# Patient Record
Sex: FEMALE | Race: BLACK OR AFRICAN AMERICAN | NOT HISPANIC OR LATINO | Employment: STUDENT | URBAN - METROPOLITAN AREA
[De-identification: names, ages, dates, MRNs, and addresses within clinical notes are randomized per-mention and may not be internally consistent; named-entity substitution may affect disease eponyms.]

---
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
## 2023-09-23 ENCOUNTER — HOSPITAL ENCOUNTER (INPATIENT)
Facility: HOSPITAL | Age: 23
LOS: 3 days | Discharge: HOME | DRG: 603 | End: 2023-09-26
Attending: FAMILY MEDICINE | Admitting: INTERNAL MEDICINE
Payer: MEDICAID

## 2023-09-23 DIAGNOSIS — L02.412 ABSCESS OF LEFT AXILLA: Primary | ICD-10-CM

## 2023-09-23 PROBLEM — L73.2 HIDRADENITIS SUPPURATIVA OF LEFT AXILLA: Status: ACTIVE | Noted: 2023-09-23

## 2023-09-23 PROBLEM — R65.10 SIRS (SYSTEMIC INFLAMMATORY RESPONSE SYNDROME): Status: ACTIVE | Noted: 2023-09-23

## 2023-09-23 LAB
ALBUMIN SERPL BCP-MCNC: 3.7 G/DL (ref 3.5–5.2)
ALP SERPL-CCNC: 96 U/L (ref 55–135)
ALT SERPL W/O P-5'-P-CCNC: 28 U/L (ref 10–44)
ANION GAP SERPL CALC-SCNC: 16 MMOL/L (ref 8–16)
AST SERPL-CCNC: 21 U/L (ref 10–40)
BASOPHILS # BLD AUTO: 0.06 K/UL (ref 0–0.2)
BASOPHILS NFR BLD: 0.4 % (ref 0–1.9)
BILIRUB SERPL-MCNC: 0.5 MG/DL (ref 0.1–1)
BUN SERPL-MCNC: 6 MG/DL (ref 6–20)
CALCIUM SERPL-MCNC: 8.8 MG/DL (ref 8.7–10.5)
CHLORIDE SERPL-SCNC: 103 MMOL/L (ref 95–110)
CO2 SERPL-SCNC: 18 MMOL/L (ref 23–29)
CREAT SERPL-MCNC: 0.6 MG/DL (ref 0.5–1.4)
DIFFERENTIAL METHOD: ABNORMAL
EOSINOPHIL # BLD AUTO: 0.1 K/UL (ref 0–0.5)
EOSINOPHIL NFR BLD: 0.8 % (ref 0–8)
ERYTHROCYTE [DISTWIDTH] IN BLOOD BY AUTOMATED COUNT: 11.6 % (ref 11.5–14.5)
EST. GFR  (NO RACE VARIABLE): >60 ML/MIN/1.73 M^2
GLUCOSE SERPL-MCNC: 102 MG/DL (ref 70–110)
HCT VFR BLD AUTO: 41.8 % (ref 37–48.5)
HCV AB SERPL QL IA: NEGATIVE
HEP C VIRUS HOLD SPECIMEN: NORMAL
HGB BLD-MCNC: 14.3 G/DL (ref 12–16)
HIV 1+2 AB+HIV1 P24 AG SERPL QL IA: NEGATIVE
IMM GRANULOCYTES # BLD AUTO: 0.08 K/UL (ref 0–0.04)
IMM GRANULOCYTES NFR BLD AUTO: 0.5 % (ref 0–0.5)
LACTATE SERPL-SCNC: 0.9 MMOL/L (ref 0.5–2.2)
LYMPHOCYTES # BLD AUTO: 3.6 K/UL (ref 1–4.8)
LYMPHOCYTES NFR BLD: 21.1 % (ref 18–48)
MCH RBC QN AUTO: 30.4 PG (ref 27–31)
MCHC RBC AUTO-ENTMCNC: 34.2 G/DL (ref 32–36)
MCV RBC AUTO: 89 FL (ref 82–98)
MONOCYTES # BLD AUTO: 0.8 K/UL (ref 0.3–1)
MONOCYTES NFR BLD: 4.5 % (ref 4–15)
NEUTROPHILS # BLD AUTO: 12.3 K/UL (ref 1.8–7.7)
NEUTROPHILS NFR BLD: 72.7 % (ref 38–73)
NRBC BLD-RTO: 0 /100 WBC
PLATELET # BLD AUTO: 292 K/UL (ref 150–450)
PMV BLD AUTO: 11.2 FL (ref 9.2–12.9)
POTASSIUM SERPL-SCNC: 3.4 MMOL/L (ref 3.5–5.1)
PROCALCITONIN SERPL IA-MCNC: 0.03 NG/ML
PROT SERPL-MCNC: 8 G/DL (ref 6–8.4)
RBC # BLD AUTO: 4.7 M/UL (ref 4–5.4)
SODIUM SERPL-SCNC: 137 MMOL/L (ref 136–145)
WBC # BLD AUTO: 16.88 K/UL (ref 3.9–12.7)

## 2023-09-23 PROCEDURE — 83605 ASSAY OF LACTIC ACID: CPT | Performed by: REGISTERED NURSE

## 2023-09-23 PROCEDURE — 63600175 PHARM REV CODE 636 W HCPCS: Performed by: INTERNAL MEDICINE

## 2023-09-23 PROCEDURE — 25000003 PHARM REV CODE 250: Performed by: INTERNAL MEDICINE

## 2023-09-23 PROCEDURE — 99285 EMERGENCY DEPT VISIT HI MDM: CPT | Mod: 25

## 2023-09-23 PROCEDURE — 87150 DNA/RNA AMPLIFIED PROBE: CPT | Mod: 59 | Performed by: REGISTERED NURSE

## 2023-09-23 PROCEDURE — 84145 PROCALCITONIN (PCT): CPT | Performed by: REGISTERED NURSE

## 2023-09-23 PROCEDURE — 85025 COMPLETE CBC W/AUTO DIFF WBC: CPT | Performed by: REGISTERED NURSE

## 2023-09-23 PROCEDURE — 63600175 PHARM REV CODE 636 W HCPCS: Performed by: FAMILY MEDICINE

## 2023-09-23 PROCEDURE — 25000003 PHARM REV CODE 250: Performed by: REGISTERED NURSE

## 2023-09-23 PROCEDURE — 87389 HIV-1 AG W/HIV-1&-2 AB AG IA: CPT | Performed by: EMERGENCY MEDICINE

## 2023-09-23 PROCEDURE — 25000003 PHARM REV CODE 250: Performed by: FAMILY MEDICINE

## 2023-09-23 PROCEDURE — 96375 TX/PRO/DX INJ NEW DRUG ADDON: CPT

## 2023-09-23 PROCEDURE — 80053 COMPREHEN METABOLIC PANEL: CPT | Performed by: REGISTERED NURSE

## 2023-09-23 PROCEDURE — 96374 THER/PROPH/DIAG INJ IV PUSH: CPT

## 2023-09-23 PROCEDURE — 86803 HEPATITIS C AB TEST: CPT | Performed by: EMERGENCY MEDICINE

## 2023-09-23 PROCEDURE — 11000001 HC ACUTE MED/SURG PRIVATE ROOM

## 2023-09-23 PROCEDURE — 87040 BLOOD CULTURE FOR BACTERIA: CPT | Mod: 59 | Performed by: REGISTERED NURSE

## 2023-09-23 RX ORDER — MAG HYDROX/ALUMINUM HYD/SIMETH 200-200-20
30 SUSPENSION, ORAL (FINAL DOSE FORM) ORAL EVERY 6 HOURS PRN
Status: DISCONTINUED | OUTPATIENT
Start: 2023-09-23 | End: 2023-09-26 | Stop reason: HOSPADM

## 2023-09-23 RX ORDER — KETOROLAC TROMETHAMINE 30 MG/ML
30 INJECTION, SOLUTION INTRAMUSCULAR; INTRAVENOUS
Status: COMPLETED | OUTPATIENT
Start: 2023-09-23 | End: 2023-09-23

## 2023-09-23 RX ORDER — IPRATROPIUM BROMIDE AND ALBUTEROL SULFATE 2.5; .5 MG/3ML; MG/3ML
3 SOLUTION RESPIRATORY (INHALATION) EVERY 4 HOURS PRN
Status: DISCONTINUED | OUTPATIENT
Start: 2023-09-23 | End: 2023-09-26 | Stop reason: HOSPADM

## 2023-09-23 RX ORDER — ONDANSETRON 2 MG/ML
4 INJECTION INTRAMUSCULAR; INTRAVENOUS EVERY 8 HOURS PRN
Status: DISCONTINUED | OUTPATIENT
Start: 2023-09-23 | End: 2023-09-26 | Stop reason: HOSPADM

## 2023-09-23 RX ORDER — GUAIFENESIN 100 MG/5ML
200 SOLUTION ORAL EVERY 4 HOURS PRN
Status: DISCONTINUED | OUTPATIENT
Start: 2023-09-23 | End: 2023-09-26 | Stop reason: HOSPADM

## 2023-09-23 RX ORDER — MORPHINE SULFATE 4 MG/ML
2 INJECTION, SOLUTION INTRAMUSCULAR; INTRAVENOUS EVERY 4 HOURS PRN
Status: DISCONTINUED | OUTPATIENT
Start: 2023-09-23 | End: 2023-09-24

## 2023-09-23 RX ORDER — HYDROCODONE BITARTRATE AND ACETAMINOPHEN 10; 325 MG/1; MG/1
1 TABLET ORAL
Status: COMPLETED | OUTPATIENT
Start: 2023-09-23 | End: 2023-09-23

## 2023-09-23 RX ORDER — HYDRALAZINE HYDROCHLORIDE 20 MG/ML
10 INJECTION INTRAMUSCULAR; INTRAVENOUS EVERY 8 HOURS PRN
Status: DISCONTINUED | OUTPATIENT
Start: 2023-09-23 | End: 2023-09-26 | Stop reason: HOSPADM

## 2023-09-23 RX ORDER — ACETAMINOPHEN 325 MG/1
650 TABLET ORAL EVERY 6 HOURS PRN
Status: DISCONTINUED | OUTPATIENT
Start: 2023-09-23 | End: 2023-09-26 | Stop reason: HOSPADM

## 2023-09-23 RX ORDER — SODIUM CHLORIDE 9 MG/ML
INJECTION, SOLUTION INTRAVENOUS CONTINUOUS
Status: ACTIVE | OUTPATIENT
Start: 2023-09-23 | End: 2023-09-24

## 2023-09-23 RX ORDER — MORPHINE SULFATE 4 MG/ML
4 INJECTION, SOLUTION INTRAMUSCULAR; INTRAVENOUS EVERY 4 HOURS PRN
Status: DISCONTINUED | OUTPATIENT
Start: 2023-09-23 | End: 2023-09-26

## 2023-09-23 RX ADMIN — KETOROLAC TROMETHAMINE 30 MG: 30 INJECTION, SOLUTION INTRAMUSCULAR; INTRAVENOUS at 07:09

## 2023-09-23 RX ADMIN — VANCOMYCIN HYDROCHLORIDE 1000 MG: 1 INJECTION, POWDER, LYOPHILIZED, FOR SOLUTION INTRAVENOUS at 09:09

## 2023-09-23 RX ADMIN — CEFEPIME 1 G: 1 INJECTION, POWDER, FOR SOLUTION INTRAMUSCULAR; INTRAVENOUS at 11:09

## 2023-09-23 RX ADMIN — ACETAMINOPHEN 650 MG: 325 TABLET ORAL at 11:09

## 2023-09-23 RX ADMIN — HYDROCODONE BITARTRATE AND ACETAMINOPHEN 1 TABLET: 10; 325 TABLET ORAL at 05:09

## 2023-09-23 RX ADMIN — SODIUM CHLORIDE: 9 INJECTION, SOLUTION INTRAVENOUS at 11:09

## 2023-09-23 NOTE — FIRST PROVIDER EVALUATION
Medical screening examination initiated.  I have conducted a focused provider triage encounter, findings are as follows:    Brief history of present illness:  Left axillary swelling and redness, history of HS.  Currently taking antibiotics with no relief.    Vitals:    09/23/23 1704   BP: (!) 142/99   BP Location: Right arm   Patient Position: Sitting   Pulse: (!) 119   Resp: 20   Temp: 98 °F (36.7 °C)   TempSrc: Oral   SpO2: 100%   Weight: 79.5 kg (175 lb 4.3 oz)       Pertinent physical exam:  Tachycardic, patient will not let me palpate site    Brief workup plan:  Workup    Preliminary workup initiated; this workup will be continued and followed by the physician or advanced practice provider that is assigned to the patient when roomed.

## 2023-09-23 NOTE — ED NOTES
Bed: [facility] 03  Expected date:   Expected time:   Means of arrival:   Comments:

## 2023-09-24 ENCOUNTER — ANESTHESIA (OUTPATIENT)
Dept: SURGERY | Facility: HOSPITAL | Age: 23
DRG: 603 | End: 2023-09-24
Payer: MEDICAID

## 2023-09-24 ENCOUNTER — ANESTHESIA EVENT (OUTPATIENT)
Dept: SURGERY | Facility: HOSPITAL | Age: 23
DRG: 603 | End: 2023-09-24
Payer: MEDICAID

## 2023-09-24 LAB
ALBUMIN SERPL BCP-MCNC: 3.3 G/DL (ref 3.5–5.2)
ALP SERPL-CCNC: 84 U/L (ref 55–135)
ALT SERPL W/O P-5'-P-CCNC: 23 U/L (ref 10–44)
ANION GAP SERPL CALC-SCNC: 6 MMOL/L (ref 8–16)
AST SERPL-CCNC: 17 U/L (ref 10–40)
BASOPHILS # BLD AUTO: 0.05 K/UL (ref 0–0.2)
BASOPHILS NFR BLD: 0.3 % (ref 0–1.9)
BILIRUB SERPL-MCNC: 0.4 MG/DL (ref 0.1–1)
BUN SERPL-MCNC: 7 MG/DL (ref 6–20)
CALCIUM SERPL-MCNC: 8.6 MG/DL (ref 8.7–10.5)
CHLORIDE SERPL-SCNC: 109 MMOL/L (ref 95–110)
CO2 SERPL-SCNC: 24 MMOL/L (ref 23–29)
CREAT SERPL-MCNC: 0.6 MG/DL (ref 0.5–1.4)
DIFFERENTIAL METHOD: ABNORMAL
EOSINOPHIL # BLD AUTO: 0.3 K/UL (ref 0–0.5)
EOSINOPHIL NFR BLD: 1.5 % (ref 0–8)
ERYTHROCYTE [DISTWIDTH] IN BLOOD BY AUTOMATED COUNT: 11.7 % (ref 11.5–14.5)
EST. GFR  (NO RACE VARIABLE): >60 ML/MIN/1.73 M^2
GLUCOSE SERPL-MCNC: 102 MG/DL (ref 70–110)
HCT VFR BLD AUTO: 39.4 % (ref 37–48.5)
HGB BLD-MCNC: 13.2 G/DL (ref 12–16)
IMM GRANULOCYTES # BLD AUTO: 0.09 K/UL (ref 0–0.04)
IMM GRANULOCYTES NFR BLD AUTO: 0.5 % (ref 0–0.5)
LYMPHOCYTES # BLD AUTO: 4.3 K/UL (ref 1–4.8)
LYMPHOCYTES NFR BLD: 25 % (ref 18–48)
MAGNESIUM SERPL-MCNC: 1.9 MG/DL (ref 1.6–2.6)
MCH RBC QN AUTO: 30.1 PG (ref 27–31)
MCHC RBC AUTO-ENTMCNC: 33.5 G/DL (ref 32–36)
MCV RBC AUTO: 90 FL (ref 82–98)
MONOCYTES # BLD AUTO: 0.9 K/UL (ref 0.3–1)
MONOCYTES NFR BLD: 5.1 % (ref 4–15)
NEUTROPHILS # BLD AUTO: 11.6 K/UL (ref 1.8–7.7)
NEUTROPHILS NFR BLD: 67.6 % (ref 38–73)
NRBC BLD-RTO: 0 /100 WBC
PLATELET # BLD AUTO: 283 K/UL (ref 150–450)
PMV BLD AUTO: 11.1 FL (ref 9.2–12.9)
POTASSIUM SERPL-SCNC: 3.2 MMOL/L (ref 3.5–5.1)
PROT SERPL-MCNC: 7.1 G/DL (ref 6–8.4)
RBC # BLD AUTO: 4.38 M/UL (ref 4–5.4)
SODIUM SERPL-SCNC: 139 MMOL/L (ref 136–145)
WBC # BLD AUTO: 17.16 K/UL (ref 3.9–12.7)

## 2023-09-24 PROCEDURE — 36415 COLL VENOUS BLD VENIPUNCTURE: CPT | Performed by: INTERNAL MEDICINE

## 2023-09-24 PROCEDURE — 99222 1ST HOSP IP/OBS MODERATE 55: CPT | Mod: 25,,, | Performed by: SURGERY

## 2023-09-24 PROCEDURE — 63600175 PHARM REV CODE 636 W HCPCS: Performed by: SURGERY

## 2023-09-24 PROCEDURE — 85025 COMPLETE CBC W/AUTO DIFF WBC: CPT | Performed by: INTERNAL MEDICINE

## 2023-09-24 PROCEDURE — 25000003 PHARM REV CODE 250: Performed by: NURSE ANESTHETIST, CERTIFIED REGISTERED

## 2023-09-24 PROCEDURE — 36000704 HC OR TIME LEV I 1ST 15 MIN: Performed by: SURGERY

## 2023-09-24 PROCEDURE — 37000009 HC ANESTHESIA EA ADD 15 MINS: Performed by: SURGERY

## 2023-09-24 PROCEDURE — 11000001 HC ACUTE MED/SURG PRIVATE ROOM

## 2023-09-24 PROCEDURE — 25000003 PHARM REV CODE 250: Performed by: SURGERY

## 2023-09-24 PROCEDURE — 63600175 PHARM REV CODE 636 W HCPCS: Performed by: INTERNAL MEDICINE

## 2023-09-24 PROCEDURE — 36000705 HC OR TIME LEV I EA ADD 15 MIN: Performed by: SURGERY

## 2023-09-24 PROCEDURE — C1729 CATH, DRAINAGE: HCPCS | Performed by: SURGERY

## 2023-09-24 PROCEDURE — 80053 COMPREHEN METABOLIC PANEL: CPT | Performed by: INTERNAL MEDICINE

## 2023-09-24 PROCEDURE — 83735 ASSAY OF MAGNESIUM: CPT | Performed by: INTERNAL MEDICINE

## 2023-09-24 PROCEDURE — 63600175 PHARM REV CODE 636 W HCPCS: Performed by: NURSE ANESTHETIST, CERTIFIED REGISTERED

## 2023-09-24 PROCEDURE — 25000003 PHARM REV CODE 250: Performed by: INTERNAL MEDICINE

## 2023-09-24 PROCEDURE — 10061 PR DRAIN SKIN ABSCESS COMPLIC: ICD-10-PCS | Mod: ,,, | Performed by: SURGERY

## 2023-09-24 PROCEDURE — 99222 PR INITIAL HOSPITAL CARE,LEVL II: ICD-10-PCS | Mod: 25,,, | Performed by: SURGERY

## 2023-09-24 PROCEDURE — 10061 I&D ABSCESS COMP/MULTIPLE: CPT | Mod: ,,, | Performed by: SURGERY

## 2023-09-24 PROCEDURE — 37000008 HC ANESTHESIA 1ST 15 MINUTES: Performed by: SURGERY

## 2023-09-24 PROCEDURE — 71000033 HC RECOVERY, INTIAL HOUR: Performed by: SURGERY

## 2023-09-24 RX ORDER — DEXMEDETOMIDINE HYDROCHLORIDE 100 UG/ML
INJECTION, SOLUTION INTRAVENOUS
Status: DISCONTINUED | OUTPATIENT
Start: 2023-09-24 | End: 2023-09-24

## 2023-09-24 RX ORDER — FENTANYL CITRATE 50 UG/ML
INJECTION, SOLUTION INTRAMUSCULAR; INTRAVENOUS
Status: DISCONTINUED | OUTPATIENT
Start: 2023-09-24 | End: 2023-09-24

## 2023-09-24 RX ORDER — ONDANSETRON 2 MG/ML
4 INJECTION INTRAMUSCULAR; INTRAVENOUS ONCE AS NEEDED
Status: CANCELLED | OUTPATIENT
Start: 2023-09-24 | End: 2035-02-20

## 2023-09-24 RX ORDER — ONDANSETRON 2 MG/ML
INJECTION INTRAMUSCULAR; INTRAVENOUS
Status: DISCONTINUED | OUTPATIENT
Start: 2023-09-24 | End: 2023-09-24

## 2023-09-24 RX ORDER — LIDOCAINE HYDROCHLORIDE 10 MG/ML
INJECTION, SOLUTION EPIDURAL; INFILTRATION; INTRACAUDAL; PERINEURAL
Status: DISCONTINUED | OUTPATIENT
Start: 2023-09-24 | End: 2023-09-24 | Stop reason: HOSPADM

## 2023-09-24 RX ORDER — FENTANYL CITRATE 50 UG/ML
25 INJECTION, SOLUTION INTRAMUSCULAR; INTRAVENOUS EVERY 5 MIN PRN
Status: CANCELLED | OUTPATIENT
Start: 2023-09-24

## 2023-09-24 RX ORDER — POTASSIUM CHLORIDE 20 MEQ/1
40 TABLET, EXTENDED RELEASE ORAL ONCE
Status: COMPLETED | OUTPATIENT
Start: 2023-09-24 | End: 2023-09-24

## 2023-09-24 RX ORDER — DIPHENHYDRAMINE HYDROCHLORIDE 50 MG/ML
25 INJECTION INTRAMUSCULAR; INTRAVENOUS EVERY 6 HOURS PRN
Status: CANCELLED | OUTPATIENT
Start: 2023-09-24

## 2023-09-24 RX ORDER — LIDOCAINE HYDROCHLORIDE 20 MG/ML
INJECTION INTRAVENOUS
Status: DISCONTINUED | OUTPATIENT
Start: 2023-09-24 | End: 2023-09-24

## 2023-09-24 RX ORDER — DEXAMETHASONE SODIUM PHOSPHATE 4 MG/ML
INJECTION, SOLUTION INTRA-ARTICULAR; INTRALESIONAL; INTRAMUSCULAR; INTRAVENOUS; SOFT TISSUE
Status: DISCONTINUED | OUTPATIENT
Start: 2023-09-24 | End: 2023-09-24

## 2023-09-24 RX ORDER — HYDROCODONE BITARTRATE AND ACETAMINOPHEN 5; 325 MG/1; MG/1
1 TABLET ORAL
Status: CANCELLED | OUTPATIENT
Start: 2023-09-24

## 2023-09-24 RX ORDER — BUPIVACAINE HYDROCHLORIDE 2.5 MG/ML
INJECTION, SOLUTION EPIDURAL; INFILTRATION; INTRACAUDAL
Status: DISCONTINUED | OUTPATIENT
Start: 2023-09-24 | End: 2023-09-24 | Stop reason: HOSPADM

## 2023-09-24 RX ORDER — HYDROCODONE BITARTRATE AND ACETAMINOPHEN 10; 325 MG/1; MG/1
1 TABLET ORAL EVERY 6 HOURS PRN
Status: DISCONTINUED | OUTPATIENT
Start: 2023-09-24 | End: 2023-09-26 | Stop reason: HOSPADM

## 2023-09-24 RX ORDER — PROPOFOL 10 MG/ML
VIAL (ML) INTRAVENOUS
Status: DISCONTINUED | OUTPATIENT
Start: 2023-09-24 | End: 2023-09-24

## 2023-09-24 RX ADMIN — PROPOFOL 200 MG: 10 INJECTION, EMULSION INTRAVENOUS at 10:09

## 2023-09-24 RX ADMIN — SODIUM CHLORIDE, POTASSIUM CHLORIDE, SODIUM LACTATE AND CALCIUM CHLORIDE: 600; 310; 30; 20 INJECTION, SOLUTION INTRAVENOUS at 10:09

## 2023-09-24 RX ADMIN — HYDROCODONE BITARTRATE AND ACETAMINOPHEN 1 TABLET: 10; 325 TABLET ORAL at 09:09

## 2023-09-24 RX ADMIN — SODIUM CHLORIDE: 9 INJECTION, SOLUTION INTRAVENOUS at 07:09

## 2023-09-24 RX ADMIN — SODIUM CHLORIDE: 9 INJECTION, SOLUTION INTRAVENOUS at 06:09

## 2023-09-24 RX ADMIN — POTASSIUM CHLORIDE 40 MEQ: 1500 TABLET, EXTENDED RELEASE ORAL at 08:09

## 2023-09-24 RX ADMIN — CEFEPIME 1 G: 1 INJECTION, POWDER, FOR SOLUTION INTRAMUSCULAR; INTRAVENOUS at 03:09

## 2023-09-24 RX ADMIN — MORPHINE SULFATE 4 MG: 4 INJECTION INTRAVENOUS at 08:09

## 2023-09-24 RX ADMIN — FENTANYL CITRATE 50 MCG: 50 INJECTION, SOLUTION INTRAMUSCULAR; INTRAVENOUS at 10:09

## 2023-09-24 RX ADMIN — DEXAMETHASONE SODIUM PHOSPHATE 4 MG: 4 INJECTION, SOLUTION INTRA-ARTICULAR; INTRALESIONAL; INTRAMUSCULAR; INTRAVENOUS; SOFT TISSUE at 11:09

## 2023-09-24 RX ADMIN — HYDROCODONE BITARTRATE AND ACETAMINOPHEN 1 TABLET: 10; 325 TABLET ORAL at 03:09

## 2023-09-24 RX ADMIN — DEXMEDETOMIDINE 20 MCG: 200 INJECTION, SOLUTION INTRAVENOUS at 10:09

## 2023-09-24 RX ADMIN — MORPHINE SULFATE 4 MG: 4 INJECTION INTRAVENOUS at 04:09

## 2023-09-24 RX ADMIN — CEFEPIME 1 G: 1 INJECTION, POWDER, FOR SOLUTION INTRAMUSCULAR; INTRAVENOUS at 08:09

## 2023-09-24 RX ADMIN — ONDANSETRON 4 MG: 2 INJECTION INTRAMUSCULAR; INTRAVENOUS at 11:09

## 2023-09-24 RX ADMIN — LIDOCAINE HYDROCHLORIDE 100 MG: 20 INJECTION INTRAVENOUS at 10:09

## 2023-09-24 RX ADMIN — MORPHINE SULFATE 2 MG: 4 INJECTION INTRAVENOUS at 12:09

## 2023-09-24 NOTE — ED PROVIDER NOTES
SCRIBE #1 NOTE: I, Me-Gail Hankins, am scribing for, and in the presence of, Jennifer Luna MD. I have scribed the entire note.       History     Chief Complaint   Patient presents with    Abscess     Abscess to left axilla x 3 days, taken antibiotics x a week, doesn't know name of pill. Hx of hidradenitis.     Review of patient's allergies indicates:  No Known Allergies      History of Present Illness     HPI    9/23/2023, 7:49 PM  History obtained from the patient      History of Present Illness: Barby Farr is a 23 y.o. female patient with a PMHx of asthma and hidradenitis who presents to the Emergency Department for evaluation of abscess to the L axilla which onset 3 days ago. Pt states that she often gets these but states that this is the first time it has gotten to severe. Pt has been on antibiotics prescribed by dermatologist -- doxycycline, taken once AM and once PM -- since July 2023 and states that it is not relieving the pain. Symptoms are constant and worsening in severity. Pt is raising her L arm to mitigate the pain. No associated sxs reported. Patient denies any drainage, and all other sxs at this time. No further complaints or concerns at this time.       Arrival mode: Personal vehicle      PCP: Antonette Leahy MD        Past Medical History:  Past Medical History:   Diagnosis Date    Asthma        Past Surgical History:  Past Surgical History:   Procedure Laterality Date    INCISION AND DRAINAGE OF ABSCESS Left 9/24/2023    Procedure: INCISION AND DRAINAGE, ABSCESS;  Surgeon: Antonio Moseley MD;  Location: [facility];  Service: General;  Laterality: Left;  left axilla         Family History:  History reviewed. No pertinent family history.    Social History:  Social History     Tobacco Use    Smoking status: Never    Smokeless tobacco: Not on file   Substance and Sexual Activity    Alcohol use: No    Drug use: No    Sexual activity: Not on file        Review of Systems     Review of Systems    Skin:         (+) abscess to L axillary, (-) drainage in abscess   All other systems reviewed and are negative.       Physical Exam     Initial Vitals [09/23/23 1704]   BP Pulse Resp Temp SpO2   (!) 142/99 (!) 119 20 98 °F (36.7 °C) 100 %      MAP       --          Physical Exam  Nursing Notes and Vital Signs Reviewed.  Constitutional: Patient is in no acute distress. Well-developed and well-nourished.  Head: Atraumatic. Normocephalic.  Eyes: PERRL. EOM intact. Conjunctivae are not pale. No scleral icterus.  ENT: Mucous membranes are moist. Oropharynx is clear and symmetric.    Neck: Supple. Full ROM. No lymphadenopathy.  Cardiovascular: Regular rate. Regular rhythm. No murmurs, rubs, or gallops. Distal pulses are 2+ and symmetric.  Pulmonary/Chest: No respiratory distress. Clear to auscultation bilaterally. No wheezing or rales.  Abdominal: Soft and non-distended.  There is no tenderness.  No rebound, guarding, or rigidity. Good bowel sounds.  Genitourinary: No CVA tenderness  Musculoskeletal: Moves all extremities. No obvious deformities. No edema. No calf tenderness.  Skin: Warm and dry.  L axilla: Swollen, tender, and warmth extending across anterior chest wall and shoulder.    Neurological:  Alert, awake, and appropriate.  Normal speech.  No acute focal neurological deficits are appreciated.  Psychiatric: Normal affect. Good eye contact. Appropriate in content.     ED Course   Procedures  ED Vital Signs:  Vitals:    09/25/23 0432 09/25/23 0439 09/25/23 0730 09/25/23 0937   BP:  135/81 132/75    Pulse:  73 72    Resp: 18 19 18 15   Temp:   98.2 °F (36.8 °C)    TempSrc:   Oral    SpO2:  100% 100%    Weight:       Height:        09/25/23 1209 09/25/23 1224 09/25/23 1544 09/25/23 1829   BP: (!) 128/90  119/86    Pulse: 74  74    Resp: 18 16 18 15   Temp: 98.9 °F (37.2 °C)  97.4 °F (36.3 °C)    TempSrc: Oral  Oral    SpO2: 98%  98%    Weight:       Height:        09/25/23 1902 09/25/23 2358 09/26/23 0043 09/26/23  0530   BP: 130/81 (!) 108/56  115/74   Pulse: 77 74  68   Resp: 18 18 18 18   Temp: 98.2 °F (36.8 °C) 98.1 °F (36.7 °C)  98.2 °F (36.8 °C)   TempSrc: Oral Oral  Oral   SpO2: 99% 100%  98%   Weight:       Height:        09/26/23 0749 09/26/23 0905 09/26/23 1533   BP: (!) 150/79  133/69   Pulse: 88  67   Resp: 14 16 18   Temp: 98.3 °F (36.8 °C)  98.1 °F (36.7 °C)   TempSrc: Oral     SpO2: 100%     Weight:      Height:          Abnormal Lab Results:  Labs Reviewed   CBC W/ AUTO DIFFERENTIAL - Abnormal; Notable for the following components:       Result Value    WBC 16.88 (*)     Gran # (ANC) 12.3 (*)     Immature Grans (Abs) 0.08 (*)     All other components within normal limits    Narrative:     Release to patient->Immediate   COMPREHENSIVE METABOLIC PANEL - Abnormal; Notable for the following components:    Potassium 3.4 (*)     CO2 18 (*)     All other components within normal limits    Narrative:     Release to patient->Immediate   HIV 1 / 2 ANTIBODY    Narrative:     Release to patient->Immediate   HEPATITIS C ANTIBODY    Narrative:     Release to patient->Immediate   HEP C VIRUS HOLD SPECIMEN    Narrative:     Release to patient->Immediate   LACTIC ACID, PLASMA    Narrative:     Release to patient->Immediate   PROCALCITONIN    Narrative:     Release to patient->Immediate        All Lab Results:  Results for orders placed or performed during the hospital encounter of 09/23/23   Blood Culture #2 **CANNOT BE ORDERED STAT**    Specimen: Peripheral, Forearm, Right; Blood   Result Value Ref Range    Blood Culture, Routine No Growth to date     Blood Culture, Routine No Growth to date     Blood Culture, Routine No Growth to date    Blood Culture #1 **CANNOT BE ORDERED STAT**    Specimen: Peripheral, Antecubital, Right; Blood   Result Value Ref Range    Blood Culture, Routine       Gram stain aer bottle: Gram positive cocci in clusters resembling Staph    Blood Culture, Routine       Results called to and read back by:  Gely Law RN 09/25/2023  00:55    Blood Culture, Routine (A)      COAGULASE-NEGATIVE STAPHYLOCOCCUS SPECIES  Organism is a probable contaminant     MRSA/SA Rapid ID by PCR from Blood culture   Result Value Ref Range    Staph aureus ID by PCR Negative Negative    Methicillin Resistant ID by PCR Negative Negative   HIV 1/2 Ag/Ab (4th Gen)   Result Value Ref Range    HIV 1/2 Ag/Ab Negative Negative   Hepatitis C Antibody   Result Value Ref Range    Hepatitis C Ab Negative Negative   HCV Virus Hold Specimen   Result Value Ref Range    HEP C Virus Hold Specimen Hold for HCV sendout    CBC auto differential   Result Value Ref Range    WBC 16.88 (H) 3.90 - 12.70 K/uL    RBC 4.70 4.00 - 5.40 M/uL    Hemoglobin 14.3 12.0 - 16.0 g/dL    Hematocrit 41.8 37.0 - 48.5 %    MCV 89 82 - 98 fL    MCH 30.4 27.0 - 31.0 pg    MCHC 34.2 32.0 - 36.0 g/dL    RDW 11.6 11.5 - 14.5 %    Platelets 292 150 - 450 K/uL    MPV 11.2 9.2 - 12.9 fL    Immature Granulocytes 0.5 0.0 - 0.5 %    Gran # (ANC) 12.3 (H) 1.8 - 7.7 K/uL    Immature Grans (Abs) 0.08 (H) 0.00 - 0.04 K/uL    Lymph # 3.6 1.0 - 4.8 K/uL    Mono # 0.8 0.3 - 1.0 K/uL    Eos # 0.1 0.0 - 0.5 K/uL    Baso # 0.06 0.00 - 0.20 K/uL    nRBC 0 0 /100 WBC    Gran % 72.7 38.0 - 73.0 %    Lymph % 21.1 18.0 - 48.0 %    Mono % 4.5 4.0 - 15.0 %    Eosinophil % 0.8 0.0 - 8.0 %    Basophil % 0.4 0.0 - 1.9 %    Differential Method Automated    Comprehensive metabolic panel   Result Value Ref Range    Sodium 137 136 - 145 mmol/L    Potassium 3.4 (L) 3.5 - 5.1 mmol/L    Chloride 103 95 - 110 mmol/L    CO2 18 (L) 23 - 29 mmol/L    Glucose 102 70 - 110 mg/dL    BUN 6 6 - 20 mg/dL    Creatinine 0.6 0.5 - 1.4 mg/dL    Calcium 8.8 8.7 - 10.5 mg/dL    Total Protein 8.0 6.0 - 8.4 g/dL    Albumin 3.7 3.5 - 5.2 g/dL    Total Bilirubin 0.5 0.1 - 1.0 mg/dL    Alkaline Phosphatase 96 55 - 135 U/L    AST 21 10 - 40 U/L    ALT 28 10 - 44 U/L    eGFR >60 >60 mL/min/1.73 m^2    Anion Gap 16 8 - 16 mmol/L   Lactic  acid, plasma   Result Value Ref Range    Lactate (Lactic Acid) 0.9 0.5 - 2.2 mmol/L   Procalcitonin   Result Value Ref Range    Procalcitonin 0.03 <0.25 ng/mL   CBC Auto Differential   Result Value Ref Range    WBC 17.16 (H) 3.90 - 12.70 K/uL    RBC 4.38 4.00 - 5.40 M/uL    Hemoglobin 13.2 12.0 - 16.0 g/dL    Hematocrit 39.4 37.0 - 48.5 %    MCV 90 82 - 98 fL    MCH 30.1 27.0 - 31.0 pg    MCHC 33.5 32.0 - 36.0 g/dL    RDW 11.7 11.5 - 14.5 %    Platelets 283 150 - 450 K/uL    MPV 11.1 9.2 - 12.9 fL    Immature Granulocytes 0.5 0.0 - 0.5 %    Gran # (ANC) 11.6 (H) 1.8 - 7.7 K/uL    Immature Grans (Abs) 0.09 (H) 0.00 - 0.04 K/uL    Lymph # 4.3 1.0 - 4.8 K/uL    Mono # 0.9 0.3 - 1.0 K/uL    Eos # 0.3 0.0 - 0.5 K/uL    Baso # 0.05 0.00 - 0.20 K/uL    nRBC 0 0 /100 WBC    Gran % 67.6 38.0 - 73.0 %    Lymph % 25.0 18.0 - 48.0 %    Mono % 5.1 4.0 - 15.0 %    Eosinophil % 1.5 0.0 - 8.0 %    Basophil % 0.3 0.0 - 1.9 %    Differential Method Automated    Magnesium   Result Value Ref Range    Magnesium 1.9 1.6 - 2.6 mg/dL   Comprehensive Metabolic Panel   Result Value Ref Range    Sodium 139 136 - 145 mmol/L    Potassium 3.2 (L) 3.5 - 5.1 mmol/L    Chloride 109 95 - 110 mmol/L    CO2 24 23 - 29 mmol/L    Glucose 102 70 - 110 mg/dL    BUN 7 6 - 20 mg/dL    Creatinine 0.6 0.5 - 1.4 mg/dL    Calcium 8.6 (L) 8.7 - 10.5 mg/dL    Total Protein 7.1 6.0 - 8.4 g/dL    Albumin 3.3 (L) 3.5 - 5.2 g/dL    Total Bilirubin 0.4 0.1 - 1.0 mg/dL    Alkaline Phosphatase 84 55 - 135 U/L    AST 17 10 - 40 U/L    ALT 23 10 - 44 U/L    eGFR >60 >60 mL/min/1.73 m^2    Anion Gap 6 (L) 8 - 16 mmol/L   CBC Auto Differential   Result Value Ref Range    WBC 17.78 (H) 3.90 - 12.70 K/uL    RBC 4.09 4.00 - 5.40 M/uL    Hemoglobin 12.7 12.0 - 16.0 g/dL    Hematocrit 37.6 37.0 - 48.5 %    MCV 92 82 - 98 fL    MCH 31.1 (H) 27.0 - 31.0 pg    MCHC 33.8 32.0 - 36.0 g/dL    RDW 11.6 11.5 - 14.5 %    Platelets 248 150 - 450 K/uL    MPV 10.7 9.2 - 12.9 fL    Immature  Granulocytes 0.4 0.0 - 0.5 %    Gran # (ANC) 13.2 (H) 1.8 - 7.7 K/uL    Immature Grans (Abs) 0.07 (H) 0.00 - 0.04 K/uL    Lymph # 3.5 1.0 - 4.8 K/uL    Mono # 0.7 0.3 - 1.0 K/uL    Eos # 0.2 0.0 - 0.5 K/uL    Baso # 0.05 0.00 - 0.20 K/uL    nRBC 0 0 /100 WBC    Gran % 74.3 (H) 38.0 - 73.0 %    Lymph % 19.9 18.0 - 48.0 %    Mono % 4.0 4.0 - 15.0 %    Eosinophil % 1.1 0.0 - 8.0 %    Basophil % 0.3 0.0 - 1.9 %    Differential Method Automated    Basic metabolic panel   Result Value Ref Range    Sodium 140 136 - 145 mmol/L    Potassium 3.4 (L) 3.5 - 5.1 mmol/L    Chloride 106 95 - 110 mmol/L    CO2 22 (L) 23 - 29 mmol/L    Glucose 123 (H) 70 - 110 mg/dL    BUN 4 (L) 6 - 20 mg/dL    Creatinine 0.6 0.5 - 1.4 mg/dL    Calcium 8.5 (L) 8.7 - 10.5 mg/dL    Anion Gap 12 8 - 16 mmol/L    eGFR >60 >60 mL/min/1.73 m^2   CBC Auto Differential   Result Value Ref Range    WBC 12.15 3.90 - 12.70 K/uL    RBC 3.92 (L) 4.00 - 5.40 M/uL    Hemoglobin 12.0 12.0 - 16.0 g/dL    Hematocrit 37.2 37.0 - 48.5 %    MCV 95 82 - 98 fL    MCH 30.6 27.0 - 31.0 pg    MCHC 32.3 32.0 - 36.0 g/dL    RDW 11.8 11.5 - 14.5 %    Platelets 265 150 - 450 K/uL    MPV 11.1 9.2 - 12.9 fL    Immature Granulocytes 0.2 0.0 - 0.5 %    Gran # (ANC) 7.5 1.8 - 7.7 K/uL    Immature Grans (Abs) 0.03 0.00 - 0.04 K/uL    Lymph # 3.7 1.0 - 4.8 K/uL    Mono # 0.5 0.3 - 1.0 K/uL    Eos # 0.4 0.0 - 0.5 K/uL    Baso # 0.05 0.00 - 0.20 K/uL    nRBC 0 0 /100 WBC    Gran % 61.9 38.0 - 73.0 %    Lymph % 30.5 18.0 - 48.0 %    Mono % 4.0 4.0 - 15.0 %    Eosinophil % 3.0 0.0 - 8.0 %    Basophil % 0.4 0.0 - 1.9 %    Differential Method Automated    Basic metabolic panel   Result Value Ref Range    Sodium 139 136 - 145 mmol/L    Potassium 3.1 (L) 3.5 - 5.1 mmol/L    Chloride 108 95 - 110 mmol/L    CO2 23 23 - 29 mmol/L    Glucose 119 (H) 70 - 110 mg/dL    BUN 9 6 - 20 mg/dL    Creatinine 0.6 0.5 - 1.4 mg/dL    Calcium 8.3 (L) 8.7 - 10.5 mg/dL    Anion Gap 8 8 - 16 mmol/L    eGFR >60 >60  mL/min/1.73 m^2         Imaging Results:  Imaging Results               CT Shoulder Without Contrast Left (Final result)  Result time 09/23/23 20:16:37      Final result by Freddie Boudreaux MD (09/23/23 20:16:37)                   Impression:      As above.    This report was flagged in Epic as abnormal.    All CT scans at this facility are performed  using dose modulation techniques as appropriate to performed exam including the following:  automated exposure control; adjustment of mA and/or kV according to the patients size (this includes techniques or standardized protocols for targeted exams where dose is matched to indication/reason for exam: i.e. extremities or head);  iterative reconstruction technique.      Electronically signed by: Freddie Boudreaux  Date:    09/23/2023  Time:    20:16               Narrative:    EXAMINATION:  CT SHOULDER WITHOUT CONTRAST LEFT    CLINICAL HISTORY:  Soft tissue mass, shoulder;    TECHNIQUE:  Low-dose axial noncontrast CT images of the shoulder were obtained.    COMPARISON:  None    FINDINGS:  Soft tissue mass along the axillary region.  Absence of IV contrast significantly degrades the exam.  It is unclear if this represents neoplasm or abscess.  Correlation with symptoms.    Local inflammatory stranding favors infection.  Skin thickening and suspected reactive lymphadenopathy.    Osseous structures grossly intact.  No fracture or traumatic malalignment.  Bipartite acromion.                                              The Emergency Provider reviewed the vital signs and test results, which are outlined above.     ED Discussion     9:29 PM: Discussed pt's case with Dr. Antonio Moseley MD (General Surgery) who recommends admission for IV antibiotics. Dr. Moseley also recommends NPO after midnight and consult tomorrow to see if there is anything that needs to be drained.    9:32 PM: Discussed case with Laura Johns NP (Hospital Medicine). Dr. Marshall agrees with current care and  management of pt and accepts admission.   Admitting Service: Hospital Medicine  Admitting Physician: Dr. Marshall  Admit to: Inpatient Med/Surg    9:33 PM: Re-evaluated pt. I have discussed test results, shared treatment plan, and the need for admission with patient and family at bedside. Pt and family express understanding at this time and agree with all information. All questions answered. Pt and family have no further questions or concerns at this time. Pt is ready for admit.       Medical Decision Making  Amount and/or Complexity of Data Reviewed  Radiology: ordered.    Risk  Prescription drug management.  Decision regarding hospitalization.                ED Medication(s):  Medications   acetaminophen tablet 650 mg (650 mg Oral Given 9/23/23 2302)   ondansetron injection 4 mg (has no administration in time range)   albuterol-ipratropium 2.5 mg-0.5 mg/3 mL nebulizer solution 3 mL (has no administration in time range)   aluminum-magnesium hydroxide-simethicone 200-200-20 mg/5 mL suspension 30 mL (has no administration in time range)   guaiFENesin 100 mg/5 ml syrup 200 mg (has no administration in time range)   0.9%  NaCl infusion ( Intravenous New Bag 9/24/23 1826)   hydrALAZINE injection 10 mg (has no administration in time range)   HYDROcodone-acetaminophen  mg per tablet 1 tablet (1 tablet Oral Given 9/26/23 0905)   0.9%  NaCl infusion (has no administration in time range)   potassium chloride SA CR tablet 40 mEq (40 mEq Oral Given 9/26/23 0905)   cefazolin (ANCEF) 2 gram in dextrose 5% 50 mL IVPB (premix) (has no administration in time range)   morphine injection 2 mg (has no administration in time range)   HYDROcodone-acetaminophen  mg per tablet 1 tablet (1 tablet Oral Given 9/23/23 1727)   ketorolac injection 30 mg (30 mg Intravenous Given 9/23/23 1953)   vancomycin (VANCOCIN) 1,000 mg in dextrose 5 % (D5W) 250 mL IVPB (Vial-Mate) (0 mg Intravenous Stopped 9/23/23 2342)   potassium chloride SA CR  tablet 40 mEq (40 mEq Oral Given 9/24/23 0848)   vancomycin 1.75 g in 5 % dextrose 500 mL IVPB (0 mg Intravenous Stopped 9/25/23 0417)   potassium chloride SA CR tablet 40 mEq (40 mEq Oral Given 9/25/23 1044)   ketorolac injection 15 mg (15 mg Intravenous Given 9/25/23 1046)   morphine injection 2 mg (2 mg Intravenous Given 9/25/23 1224)       There are no discharge medications for this patient.              Scribe Attestation:   Scribe #1: I performed the above scribed service and the documentation accurately describes the services I performed. I attest to the accuracy of the note.     Attending:   Physician Attestation Statement for Scribe #1: I, Jennifer Luna MD, personally performed the services described in this documentation, as scribed by Son Hankins, in my presence, and it is both accurate and complete.           Clinical Impression       ICD-10-CM ICD-9-CM   1. Abscess of left axilla  L02.412 682.3       Disposition:   Disposition: Admitted  Condition: Serious         Jennifer Luna MD  09/26/23 1619

## 2023-09-24 NOTE — OP NOTE
[facility] - Med Surg 3  Surgery Department  Operative Note    SUMMARY     Date of Procedure: 9/24/2023     Procedure:   Incision and drainage left axillary abscess    Surgeon(s) and Role:     * Antonio Moseley MD - Primary    Assisting Surgeon: None    Pre-Operative Diagnosis: Abscess of left axilla [L02.412]    Post-Operative Diagnosis: Post-Op Diagnosis Codes:     * Abscess of left axilla [L02.412]    Anesthesia: General    Operative Findings (including complications, if any): Incision and drainage left axillary abscess    Description of Technical Procedures:  Left axillary abscess    Estimated Blood Loss (EBL): 10cc           Implants: * No implants in log *    Specimens:   Specimen (24h ago, onward)      None                    Condition: Good    Disposition: PACU - hemodynamically stable.    Procedure in detail:   The patient was brought to the OR and underwent general anesthesia.  Her left arm, axilla, and chest were prepped and draped in usual sterile fashion.  Incision was made over the fluctuant area draining a large amount of purulent fluid.  The wound was explored and tracking to a 2nd area that was also fluctuant.  A counter incision was made and area washed out until clear.  Local anesthetic was injected around the incision site.  A Penrose drain was placed between the 2 incisions.  The wounds were packed with wet-to-dry Kerlix gauze.  Sterile dressings were applied.  The patient was transferred to recovery in a stable and satisfactory condition.

## 2023-09-24 NOTE — SUBJECTIVE & OBJECTIVE
Past Medical History:   Diagnosis Date    Asthma        History reviewed. No pertinent surgical history.    Review of patient's allergies indicates:  No Known Allergies    No current facility-administered medications on file prior to encounter.     Current Outpatient Medications on File Prior to Encounter   Medication Sig    azithromycin (ZITHROMAX Z-LALA) 250 MG tablet Take 2 tablets (500 mg total) by mouth once daily.    naproxen (NAPROSYN) 500 MG tablet Take 1 tablet (500 mg total) by mouth 2 (two) times daily with meals.     Family History    None       Tobacco Use    Smoking status: Never    Smokeless tobacco: Not on file   Substance and Sexual Activity    Alcohol use: No    Drug use: No    Sexual activity: Not on file     Review of Systems   Constitutional: Negative.  Negative for chills.   HENT: Negative.  Negative for congestion, sore throat and trouble swallowing.    Eyes: Negative.    Respiratory: Negative.  Negative for cough, shortness of breath and wheezing.    Cardiovascular: Negative.  Negative for chest pain and palpitations.   Gastrointestinal: Negative.  Negative for abdominal pain, diarrhea, nausea and vomiting.   Endocrine: Negative.    Genitourinary: Negative.  Negative for dysuria and flank pain.   Musculoskeletal: Negative.  Negative for back pain.   Skin:  Positive for wound (left axilla). Negative for rash.   Allergic/Immunologic: Negative.    Neurological: Negative.  Negative for speech difficulty, weakness, numbness and headaches.   Hematological: Negative.    Psychiatric/Behavioral: Negative.  Negative for hallucinations. The patient is not nervous/anxious.    All other systems reviewed and are negative.    Objective:     Vital Signs (Most Recent):  Temp: 98.4 °F (36.9 °C) (09/23/23 2116)  Pulse: 83 (09/23/23 2116)  Resp: 18 (09/23/23 2116)  BP: 136/87 (09/23/23 2116)  SpO2: 100 % (09/23/23 2116) Vital Signs (24h Range):  Temp:  [98 °F (36.7 °C)-98.4 °F (36.9 °C)] 98.4 °F (36.9 °C)  Pulse:   [] 83  Resp:  [18-20] 18  SpO2:  [100 %] 100 %  BP: (136-142)/(87-99) 136/87     Weight: 79.5 kg (175 lb 4.3 oz)  Body mass index is 31.05 kg/m².     Physical Exam  Vitals and nursing note reviewed.   Constitutional:       General: She is awake. She is not in acute distress.     Appearance: She is obese. She is not ill-appearing.   HENT:      Head: Normocephalic and atraumatic.      Mouth/Throat:      Mouth: Mucous membranes are moist.   Eyes:      General: No scleral icterus.     Conjunctiva/sclera: Conjunctivae normal.   Cardiovascular:      Rate and Rhythm: Normal rate and regular rhythm.   Pulmonary:      Effort: Pulmonary effort is normal. No respiratory distress.      Breath sounds: Normal breath sounds. No wheezing.   Abdominal:      Palpations: Abdomen is soft.      Tenderness: There is no abdominal tenderness.   Musculoskeletal:         General: No swelling. Normal range of motion.      Cervical back: Normal range of motion and neck supple.   Skin:     General: Skin is warm.      Coloration: Skin is not jaundiced.      Findings: Erythema (Swollen, tender, left axillary area, erythematous.  See pictures in chart) present.   Neurological:      General: No focal deficit present.      Mental Status: She is alert and oriented to person, place, and time. Mental status is at baseline.   Psychiatric:         Speech: Speech normal.         Behavior: Behavior is cooperative.                Significant Labs: All pertinent labs within the past 24 hours have been reviewed.  BMP:   Recent Labs   Lab 09/23/23 1958         K 3.4*      CO2 18*   BUN 6   CREATININE 0.6   CALCIUM 8.8     CBC:   Recent Labs   Lab 09/23/23 1958   WBC 16.88*   HGB 14.3   HCT 41.8        CMP:   Recent Labs   Lab 09/23/23 1958      K 3.4*      CO2 18*      BUN 6   CREATININE 0.6   CALCIUM 8.8   PROT 8.0   ALBUMIN 3.7   BILITOT 0.5   ALKPHOS 96   AST 21   ALT 28   ANIONGAP 16       Significant  Imaging: I have reviewed all pertinent imaging results/findings within the past 24 hours.  I have reviewed and interpreted all pertinent imaging results/findings within the past 24 hours.    Imaging Results               CT Shoulder Without Contrast Left (Final result)  Result time 09/23/23 20:16:37      Final result by Freddie Boudreaux MD (09/23/23 20:16:37)                   Impression:      As above.    This report was flagged in Epic as abnormal.    All CT scans at this facility are performed  using dose modulation techniques as appropriate to performed exam including the following:  automated exposure control; adjustment of mA and/or kV according to the patients size (this includes techniques or standardized protocols for targeted exams where dose is matched to indication/reason for exam: i.e. extremities or head);  iterative reconstruction technique.      Electronically signed by: Freddie Boudreaux  Date:    09/23/2023  Time:    20:16               Narrative:    EXAMINATION:  CT SHOULDER WITHOUT CONTRAST LEFT    CLINICAL HISTORY:  Soft tissue mass, shoulder;    TECHNIQUE:  Low-dose axial noncontrast CT images of the shoulder were obtained.    COMPARISON:  None    FINDINGS:  Soft tissue mass along the axillary region.  Absence of IV contrast significantly degrades the exam.  It is unclear if this represents neoplasm or abscess.  Correlation with symptoms.    Local inflammatory stranding favors infection.  Skin thickening and suspected reactive lymphadenopathy.    Osseous structures grossly intact.  No fracture or traumatic malalignment.  Bipartite acromion.                                    I have independently reviewed all pertinent labs within the past 24 hours.    I have independently reviewed, visualized and interpreted all pertinent imaging results within the past 24 hours and discussed the findings with the ED physician, Dr. Luna    ,   not sure

## 2023-09-24 NOTE — HPI
23-year-old female referred for left axillary abscess.  She is been undergoing treatment for hidradenitis with recent pain and swelling that has not been improving.  No drainage.  CT scan showing significant inflammation of elective axilla.  She is not been on biologic medications for hidradenitis only antibiotic washes and oral antibiotics.

## 2023-09-24 NOTE — H&P
[facility] - Emergency Dept.  [facility] Medicine  History & Physical    Patient Name: Barby Farr  MRN: [id number]  Patient Class: IP- Inpatient  Admission Date: 9/23/2023  Attending Physician: Kurt Marshall MD   Primary Care Provider: Antonette Leahy MD         Patient information was obtained from patient, past medical records and ER records.     Subjective:     Principal Problem:Abscess of left axilla    Chief Complaint:   Chief Complaint   Patient presents with    Abscess     Abscess to left axilla x 3 days, taken antibiotics x a week, doesn't know name of pill. Hx of hidradenitis.        HPI: Ms. Farr is a 23-year-old  female with PMH significant for hidradenitis suppurativa, has been on doxycycline daily since July 2023 for suppressive treatment, followed by dermatology, presented to the ED complaining of worsening swelling, pain in the left axillary region, extending to the left chest, associated with low-grade fever.  Afebrile in the ED, , WBC 53586, no bandemia lactic acidosis.  CT shoulder without contrast reveals soft tissue mass along the axillary region.  ED physician discussed case with General surgery on-call, recommended placing in observation for possible I&D in a.m..  Patient started on IV vancomycin, cefepime after obtaining cultures.      Placed in observation for left axillary abscess, history of suppurative hidradenitis.      Past Medical History:   Diagnosis Date    Asthma        History reviewed. No pertinent surgical history.    Review of patient's allergies indicates:  No Known Allergies    No current facility-administered medications on file prior to encounter.     Current Outpatient Medications on File Prior to Encounter   Medication Sig    azithromycin (ZITHROMAX Z-LALA) 250 MG tablet Take 2 tablets (500 mg total) by mouth once daily.    naproxen (NAPROSYN) 500 MG tablet Take 1 tablet (500 mg total) by mouth 2 (two) times daily with meals.     Family History     Reviewed and Not Pertinent       Tobacco Use    Smoking status: Never    Smokeless tobacco: Not on file   Substance and Sexual Activity    Alcohol use: No    Drug use: No    Sexual activity: Not on file     Review of Systems   Constitutional: Negative.  Negative for chills.   HENT: Negative.  Negative for congestion, sore throat and trouble swallowing.    Eyes: Negative.    Respiratory: Negative.  Negative for cough, shortness of breath and wheezing.    Cardiovascular: Negative.  Negative for chest pain and palpitations.   Gastrointestinal: Negative.  Negative for abdominal pain, diarrhea, nausea and vomiting.   Endocrine: Negative.    Genitourinary: Negative.  Negative for dysuria and flank pain.   Musculoskeletal: Negative.  Negative for back pain.   Skin:  Positive for wound (left axilla). Negative for rash.   Allergic/Immunologic: Negative.    Neurological: Negative.  Negative for speech difficulty, weakness, numbness and headaches.   Hematological: Negative.    Psychiatric/Behavioral: Negative.  Negative for hallucinations. The patient is not nervous/anxious.    All other systems reviewed and are negative.    Objective:     Vital Signs (Most Recent):  Temp: 98.4 °F (36.9 °C) (09/23/23 2116)  Pulse: 83 (09/23/23 2116)  Resp: 18 (09/23/23 2116)  BP: 136/87 (09/23/23 2116)  SpO2: 100 % (09/23/23 2116) Vital Signs (24h Range):  Temp:  [98 °F (36.7 °C)-98.4 °F (36.9 °C)] 98.4 °F (36.9 °C)  Pulse:  [] 83  Resp:  [18-20] 18  SpO2:  [100 %] 100 %  BP: (136-142)/(87-99) 136/87     Weight: 79.5 kg (175 lb 4.3 oz)  Body mass index is 31.05 kg/m².     Physical Exam  Vitals and nursing note reviewed.   Constitutional:       General: She is awake. She is not in acute distress.     Appearance: She is obese. She is not ill-appearing.   HENT:      Head: Normocephalic and atraumatic.      Mouth/Throat:      Mouth: Mucous membranes are moist.   Eyes:      General: No scleral icterus.     Conjunctiva/sclera:  Conjunctivae normal.   Cardiovascular:      Rate and Rhythm: Normal rate and regular rhythm.   Pulmonary:      Effort: Pulmonary effort is normal. No respiratory distress.      Breath sounds: Normal breath sounds. No wheezing.   Abdominal:      Palpations: Abdomen is soft.      Tenderness: There is no abdominal tenderness.   Musculoskeletal:         General: No swelling. Normal range of motion.      Cervical back: Normal range of motion and neck supple.   Skin:     General: Skin is warm.      Coloration: Skin is not jaundiced.      Findings: Erythema (Swollen, tender, left axillary area, erythematous.  See pictures in chart) present.   Neurological:      General: No focal deficit present.      Mental Status: She is alert and oriented to person, place, and time. Mental status is at baseline.   Psychiatric:         Speech: Speech normal.         Behavior: Behavior is cooperative.                Significant Labs: All pertinent labs within the past 24 hours have been reviewed.  BMP:   Recent Labs   Lab 09/23/23 1958         K 3.4*      CO2 18*   BUN 6   CREATININE 0.6   CALCIUM 8.8     CBC:   Recent Labs   Lab 09/23/23 1958   WBC 16.88*   HGB 14.3   HCT 41.8        CMP:   Recent Labs   Lab 09/23/23 1958      K 3.4*      CO2 18*      BUN 6   CREATININE 0.6   CALCIUM 8.8   PROT 8.0   ALBUMIN 3.7   BILITOT 0.5   ALKPHOS 96   AST 21   ALT 28   ANIONGAP 16       Significant Imaging: I have reviewed all pertinent imaging results/findings within the past 24 hours.  I have reviewed and interpreted all pertinent imaging results/findings within the past 24 hours.    Imaging Results               CT Shoulder Without Contrast Left (Final result)  Result time 09/23/23 20:16:37      Final result by Freddie Boudreaux MD (09/23/23 20:16:37)                   Impression:      As above.    This report was flagged in Epic as abnormal.    All CT scans at this facility are performed  using  dose modulation techniques as appropriate to performed exam including the following:  automated exposure control; adjustment of mA and/or kV according to the patients size (this includes techniques or standardized protocols for targeted exams where dose is matched to indication/reason for exam: i.e. extremities or head);  iterative reconstruction technique.      Electronically signed by: Freddie Janusz  Date:    09/23/2023  Time:    20:16               Narrative:    EXAMINATION:  CT SHOULDER WITHOUT CONTRAST LEFT    CLINICAL HISTORY:  Soft tissue mass, shoulder;    TECHNIQUE:  Low-dose axial noncontrast CT images of the shoulder were obtained.    COMPARISON:  None    FINDINGS:  Soft tissue mass along the axillary region.  Absence of IV contrast significantly degrades the exam.  It is unclear if this represents neoplasm or abscess.  Correlation with symptoms.    Local inflammatory stranding favors infection.  Skin thickening and suspected reactive lymphadenopathy.    Osseous structures grossly intact.  No fracture or traumatic malalignment.  Bipartite acromion.                                    I have independently reviewed all pertinent labs within the past 24 hours.    I have independently reviewed, visualized and interpreted all pertinent imaging results within the past 24 hours and discussed the findings with the ED physician, Dr. Luna    ,    Assessment/Plan:     * Abscess of left axilla  -general surgery consulted.    -keep NPO past midnight.    -continue IV vancomycin, cefepime.      SIRS (systemic inflammatory response syndrome)  -afebrile in the ED, , WBC 02365.    -secondary to left axillary abscess/hidradenitis suppurative.    -continue IV vancomycin, cefepime.    -continue fluids.    -hemodynamically stable.      Hidradenitis suppurativa of left axilla  -per patient, has been on suppressive therapy with doxycycline for the past few months, followed by Dermatology.        VTE Risk Mitigation (From  admission, onward)         Ordered     Place sequential compression device  Until discontinued         09/23/23 7973                   Kurt Marshall MD  Department of Hospital Medicine  [facility] - Emergency Dept.

## 2023-09-24 NOTE — ASSESSMENT & PLAN NOTE
-afebrile in the ED, , WBC 04367.    -secondary to left axillary abscess/hidradenitis suppurative.    -continue IV vancomycin, cefepime.    -continue fluids.    -hemodynamically stable.

## 2023-09-24 NOTE — PLAN OF CARE
Call bell and personal items within reach. VSS. Pain controlled with PRN meds. Pt ambulates independently. IV fluids and antibiotics administered. PT NPO pending general surgery consult. Chart check complete.     Problem: Adult Inpatient Plan of Care  Goal: Plan of Care Review  Outcome: Ongoing, Progressing     Problem: Adult Inpatient Plan of Care  Goal: Absence of Hospital-Acquired Illness or Injury  Outcome: Ongoing, Progressing     Problem: Pain Acute  Goal: Acceptable Pain Control and Functional Ability  Outcome: Ongoing, Progressing  Intervention: Develop Pain Management Plan  Flowsheets (Taken 9/24/2023 0136)  Pain Management Interventions: medication offered     Problem: Infection  Goal: Absence of Infection Signs and Symptoms  Outcome: Ongoing, Progressing  Intervention: Prevent or Manage Infection  Flowsheets (Taken 9/24/2023 0136)  Fever Reduction/Comfort Measures: (IV antibiotcs administered) other (see comments)

## 2023-09-24 NOTE — TRANSFER OF CARE
"Anesthesia Transfer of Care Note    Patient: Barby Farr    Procedure(s) Performed: Procedure(s) (LRB):  INCISION AND DRAINAGE, ABSCESS (Left)    Patient location: PACU    Anesthesia Type: general    Transport from OR: Transported from OR on room air with adequate spontaneous ventilation    Post pain: adequate analgesia    Post assessment: no apparent anesthetic complications and tolerated procedure well    Post vital signs: stable    Level of consciousness: awake, alert and oriented    Nausea/Vomiting: no nausea/vomiting    Complications: none    Transfer of care protocol was followed      Last vitals:   Visit Vitals  BP (!) 132/91 (BP Location: Right arm, Patient Position: Lying)   Pulse 98   Temp 36.8 °C (98.3 °F) (Oral)   Resp 16   Ht 5' 3" (1.6 m)   Wt 78.9 kg (173 lb 15.1 oz)   LMP 08/29/2023 (Exact Date)   SpO2 100%   Breastfeeding No   BMI 30.81 kg/m²     "

## 2023-09-24 NOTE — PROGRESS NOTES
[facility] Surg [address] [place] [address] Medicine  Progress Note    Patient Name: Barby Farr  MRN: [id number]  Patient Class: IP- Inpatient   Admission Date: 9/23/2023  Length of Stay: 1 days  Attending Physician: Ena Kenney MD  Primary Care Provider: Antonette Leahy MD        Subjective:     Principal Problem:Abscess of left axilla        HPI:  Ms. Farr is a 23-year-old  female with PMH significant for hidradenitis suppurativa, has been on doxycycline daily since July 2023 for suppressive treatment, followed by dermatology, presented to the ED complaining of worsening swelling, pain in the left axillary region, extending to the left chest, associated with low-grade fever.  Afebrile in the ED, , WBC 75369, no bandemia lactic acidosis.  CT shoulder without contrast reveals soft tissue mass along the axillary region.  ED physician discussed case with General surgery on-call, recommended placing in observation for possible I&D in a.m..  Patient started on IV vancomycin, cefepime after obtaining cultures.      Placed in observation for left axillary abscess, history of suppurative hidradenitis.      Overview/Hospital Course:  The patient presented with  left axillary abscess. She was placed on IV antibiotics due to significant leukocytosis in presence of oral antibiotics. Surgery was consulted and patient underwent I&D with penrose placement.       Interval History: patient seen this morning before surgery, unable to put her left arm down    Review of Systems  Objective:     Vital Signs (Most Recent):  Temp: 98.4 °F (36.9 °C) (09/24/23 1614)  Pulse: 82 (09/24/23 1614)  Resp: 18 (09/24/23 1614)  BP: 128/74 (09/24/23 1614)  SpO2: 97 % (09/24/23 1614) Vital Signs (24h Range):  Temp:  [97.8 °F (36.6 °C)-99.6 °F (37.6 °C)] 98.4 °F (36.9 °C)  Pulse:  [] 82  Resp:  [16-20] 18  SpO2:  [96 %-100 %] 97 %  BP: (112-152)/(70-99) 128/74     Weight: 78.9 kg (173 lb 15.1 oz)  Body mass index is  30.81 kg/m².    Intake/Output Summary (Last 24 hours) at 9/24/2023 1645  Last data filed at 9/24/2023 1633  Gross per 24 hour   Intake 2623.08 ml   Output --   Net 2623.08 ml         Physical Exam  HENT:      Head: Normocephalic and atraumatic.   Cardiovascular:      Rate and Rhythm: Normal rate and regular rhythm.      Heart sounds: No murmur heard.  Pulmonary:      Effort: Pulmonary effort is normal. No respiratory distress.      Breath sounds: Normal breath sounds. No wheezing.   Abdominal:      General: Bowel sounds are normal. There is no distension.      Palpations: Abdomen is soft.      Tenderness: There is no abdominal tenderness.   Musculoskeletal:         General: No swelling.   Skin:     General: Skin is warm and dry.   Neurological:      Mental Status: She is alert and oriented to person, place, and time. Mental status is at baseline.             Significant Labs: All pertinent labs within the past 24 hours have been reviewed.  CBC:   Recent Labs   Lab 09/23/23 1958 09/24/23  0420   WBC 16.88* 17.16*   HGB 14.3 13.2   HCT 41.8 39.4    283     CMP:   Recent Labs   Lab 09/23/23 1958 09/24/23  0420    139   K 3.4* 3.2*    109   CO2 18* 24    102   BUN 6 7   CREATININE 0.6 0.6   CALCIUM 8.8 8.6*   PROT 8.0 7.1   ALBUMIN 3.7 3.3*   BILITOT 0.5 0.4   ALKPHOS 96 84   AST 21 17   ALT 28 23   ANIONGAP 16 6*       Significant Imaging: I have reviewed all pertinent imaging results/findings within the past 24 hours.      Assessment/Plan:      * Abscess of left axilla  -s/p I&D with Dr. Moseley  -continue IV vancomycin, cefepime.  -repeat CBC in am  -monitor drainage      Hidradenitis suppurativa of left axilla  -per patient, has been on suppressive therapy with doxycycline for the past few months, followed by Dermatology.      SIRS (systemic inflammatory response syndrome)  -secondary to left axillary abscess/hidradenitis suppurative.    -continue IV vancomycin, cefepime.    -continue  fluids.    -hemodynamically stable.        VTE Risk Mitigation (From admission, onward)         Ordered     Place sequential compression device  Until discontinued         09/23/23 2133                Discharge Planning   RONNELL:      Code Status: Full Code   Is the patient medically ready for discharge?:     Reason for patient still in hospital (select all that apply): Patient trending condition, Treatment and Consult recommendations  Discharge Plan A: Home                  Ena Kenney MD  Department of Hospital Medicine   [facility] - Med Surg 3

## 2023-09-24 NOTE — SUBJECTIVE & OBJECTIVE
No current facility-administered medications on file prior to encounter.     No current outpatient medications on file prior to encounter.       Review of patient's allergies indicates:  No Known Allergies    Past Medical History:   Diagnosis Date    Asthma      History reviewed. No pertinent surgical history.  Family History    None       Tobacco Use    Smoking status: Never    Smokeless tobacco: Not on file   Substance and Sexual Activity    Alcohol use: No    Drug use: No    Sexual activity: Not on file     Review of Systems   Constitutional:  Negative for chills, fatigue, fever and unexpected weight change.   Respiratory:  Negative for cough, shortness of breath, wheezing and stridor.    Cardiovascular:  Negative for chest pain, palpitations and leg swelling.   Gastrointestinal:  Negative for abdominal distention, abdominal pain, constipation, diarrhea, nausea and vomiting.   Genitourinary:  Negative for difficulty urinating, dysuria, frequency, hematuria and urgency.   Skin:  Positive for wound. Negative for color change, pallor and rash.   Hematological:  Does not bruise/bleed easily.     Objective:     Vital Signs (Most Recent):  Temp: 98.3 °F (36.8 °C) (09/24/23 0731)  Pulse: 98 (09/24/23 0731)  Resp: 16 (09/24/23 0845)  BP: (!) 132/91 (09/24/23 0731)  SpO2: 100 % (09/24/23 0731) Vital Signs (24h Range):  Temp:  [97.8 °F (36.6 °C)-98.4 °F (36.9 °C)] 98.3 °F (36.8 °C)  Pulse:  [] 98  Resp:  [16-20] 16  SpO2:  [100 %] 100 %  BP: (132-152)/(87-99) 132/91     Weight: 78.9 kg (173 lb 15.1 oz)  Body mass index is 30.81 kg/m².     Physical Exam  Vitals reviewed.   Constitutional:       Appearance: She is well-developed.   HENT:      Head: Normocephalic and atraumatic.   Cardiovascular:      Rate and Rhythm: Normal rate and regular rhythm.   Pulmonary:      Effort: Pulmonary effort is normal.      Breath sounds: Normal breath sounds.   Abdominal:      General: Bowel sounds are normal. There is no distension.       Palpations: Abdomen is soft.      Tenderness: There is no abdominal tenderness.   Musculoskeletal:      Cervical back: Neck supple.   Skin:     General: Skin is warm and dry.          Neurological:      Mental Status: She is alert and oriented to person, place, and time.            I have reviewed all pertinent lab results within the past 24 hours.  CBC:   Recent Labs   Lab 09/24/23 0420   WBC 17.16*   RBC 4.38   HGB 13.2   HCT 39.4      MCV 90   MCH 30.1   MCHC 33.5     BMP:   Recent Labs   Lab 09/24/23 0420         K 3.2*      CO2 24   BUN 7   CREATININE 0.6   CALCIUM 8.6*   MG 1.9       Significant Diagnostics:  CT:  FINDINGS:  Soft tissue mass along the axillary region.  Absence of IV contrast significantly degrades the exam.  It is unclear if this represents neoplasm or abscess.  Correlation with symptoms.     Local inflammatory stranding favors infection.  Skin thickening and suspected reactive lymphadenopathy.     Osseous structures grossly intact.  No fracture or traumatic malalignment.  Bipartite acromion.

## 2023-09-24 NOTE — ANESTHESIA PREPROCEDURE EVALUATION
09/24/2023  Barby Farr is a 23 y.o., female.    Past Medical History:   Diagnosis Date    Asthma    History reviewed. No pertinent surgical history.      Pre-op Assessment    I have reviewed the Patient Summary Reports.     I have reviewed the Nursing Notes. I have reviewed the NPO Status.   I have reviewed the Medications.     Review of Systems  Anesthesia Hx:  No problems with previous Anesthesia  History of prior surgery of interest to airway management or planning: Denies Family Hx of Anesthesia complications.   Denies Personal Hx of Anesthesia complications.   Social:  Non-Smoker    Hematology/Oncology:  Hematology Normal        Cardiovascular:  Cardiovascular Normal     Pulmonary:   Asthma    Renal/:  Renal/ Normal     Hepatic/GI:  Hepatic/GI Normal    Neurological:  Neurology Normal    Endocrine:  Obesity / BMI > 30  Dermatological:   Suppurative hydradenitis, systemic inflammatory response.   Psych:  Psychiatric Normal           Physical Exam  General: Alert and Oriented    Airway:  Mallampati: II   Mouth Opening: Normal  TM Distance: Normal  Tongue: Normal  Neck ROM: Normal ROM    Dental:  Intact    Chest/Lungs:  Clear to auscultation, Normal Respiratory Rate    Heart:  Rate: Normal  Rhythm: Regular Rhythm        Anesthesia Plan  Type of Anesthesia, risks & benefits discussed:    Anesthesia Type: Gen ETT, Gen Supraglottic Airway  Intra-op Monitoring Plan: Standard ASA Monitors  Post Op Pain Control Plan: multimodal analgesia and IV/PO Opioids PRN  Induction:  IV  Informed Consent: Informed consent signed with the Patient and all parties understand the risks and agree with anesthesia plan.  All questions answered.   ASA Score: 2 Emergent  Day of Surgery Review of History & Physical: H&P Update referred to the surgeon/provider.    Ready For Surgery From Anesthesia Perspective.     .

## 2023-09-24 NOTE — ANESTHESIA POSTPROCEDURE EVALUATION
Anesthesia Post Evaluation    Patient: Barby Farr    Procedure(s) Performed: Procedure(s) (LRB):  INCISION AND DRAINAGE, ABSCESS (Left)    Final Anesthesia Type: general      Patient location during evaluation: PACU  Patient participation: Yes- Able to Participate  Level of consciousness: awake and alert and oriented  Post-procedure vital signs: reviewed and stable  Pain management: adequate  Airway patency: patent    PONV status at discharge: No PONV  Anesthetic complications: no      Cardiovascular status: blood pressure returned to baseline, stable and hemodynamically stable  Respiratory status: unassisted  Hydration status: euvolemic  Follow-up not needed.          Vitals Value Taken Time   /78 09/24/23 1149   Temp 36.8 °C (98.3 °F) 09/24/23 1135   Pulse 88 09/24/23 1149   Resp 16 09/24/23 1149   SpO2 97 % 09/24/23 1149         Event Time   Out of Recovery 09/24/2023 11:40:00         Pain/Myra Score: Pain Rating Prior to Med Admin: 10 (9/24/2023  8:45 AM)  Pain Rating Post Med Admin: 5 (9/24/2023  9:15 AM)  Myra Score: 9 (9/24/2023 11:30 AM)

## 2023-09-24 NOTE — ASSESSMENT & PLAN NOTE
-s/p I&D with Dr. Moseley  -continue IV vancomycin, cefepime.  -repeat CBC in am  -monitor drainage

## 2023-09-24 NOTE — ASSESSMENT & PLAN NOTE
To OR for incision drainage left axilla  Npo, ivfs  Iv abx  Risks and benefits discussed with pain, bleeding, infection, scarring, need for further procedure

## 2023-09-24 NOTE — ASSESSMENT & PLAN NOTE
-secondary to left axillary abscess/hidradenitis suppurative.    -continue IV vancomycin, cefepime.    -continue fluids.    -hemodynamically stable.

## 2023-09-24 NOTE — SUBJECTIVE & OBJECTIVE
Interval History: patient seen this morning before surgery, unable to put her left arm down    Review of Systems  Objective:     Vital Signs (Most Recent):  Temp: 98.4 °F (36.9 °C) (09/24/23 1614)  Pulse: 82 (09/24/23 1614)  Resp: 18 (09/24/23 1614)  BP: 128/74 (09/24/23 1614)  SpO2: 97 % (09/24/23 1614) Vital Signs (24h Range):  Temp:  [97.8 °F (36.6 °C)-99.6 °F (37.6 °C)] 98.4 °F (36.9 °C)  Pulse:  [] 82  Resp:  [16-20] 18  SpO2:  [96 %-100 %] 97 %  BP: (112-152)/(70-99) 128/74     Weight: 78.9 kg (173 lb 15.1 oz)  Body mass index is 30.81 kg/m².    Intake/Output Summary (Last 24 hours) at 9/24/2023 1645  Last data filed at 9/24/2023 1633  Gross per 24 hour   Intake 2623.08 ml   Output --   Net 2623.08 ml         Physical Exam  HENT:      Head: Normocephalic and atraumatic.   Cardiovascular:      Rate and Rhythm: Normal rate and regular rhythm.      Heart sounds: No murmur heard.  Pulmonary:      Effort: Pulmonary effort is normal. No respiratory distress.      Breath sounds: Normal breath sounds. No wheezing.   Abdominal:      General: Bowel sounds are normal. There is no distension.      Palpations: Abdomen is soft.      Tenderness: There is no abdominal tenderness.   Musculoskeletal:         General: No swelling.   Skin:     General: Skin is warm and dry.   Neurological:      Mental Status: She is alert and oriented to person, place, and time. Mental status is at baseline.             Significant Labs: All pertinent labs within the past 24 hours have been reviewed.  CBC:   Recent Labs   Lab 09/23/23 1958 09/24/23 0420   WBC 16.88* 17.16*   HGB 14.3 13.2   HCT 41.8 39.4    283     CMP:   Recent Labs   Lab 09/23/23 1958 09/24/23 0420    139   K 3.4* 3.2*    109   CO2 18* 24    102   BUN 6 7   CREATININE 0.6 0.6   CALCIUM 8.8 8.6*   PROT 8.0 7.1   ALBUMIN 3.7 3.3*   BILITOT 0.5 0.4   ALKPHOS 96 84   AST 21 17   ALT 28 23   ANIONGAP 16 6*       Significant Imaging: I have  reviewed all pertinent imaging results/findings within the past 24 hours.

## 2023-09-24 NOTE — HOSPITAL COURSE
The patient presented with  left axillary abscess. She was placed on IV antibiotics due to significant leukocytosis in presence of oral antibiotics. Surgery was consulted and patient underwent I&D with penrose placement.  Blood cultures taken on 9/23 and noted to have Gram positive cocci in clusters resembling Staph.  ID consulted on 9/25.  Leukocytosis resolved and blood cultures found to be a contaminant on final read.  Dr Burden recommended 10 days of zyvox.  She will have a penrose drain in for 7-10 days and will be pulled by Dr. Moseley at her follow up.  She will also need to follow up with Dr. Blanco (dermatology) upon discharge.  POC dw patient and Dr. Burden.  Patient was cleared by surgery yesterday.

## 2023-09-24 NOTE — ASSESSMENT & PLAN NOTE
Patient follows with outpatient dermatology  Has been on antibiotic therapy  Considering biologic therapy  Keep outpatient follow up with her dermatologist

## 2023-09-24 NOTE — PLAN OF CARE
[facility]  Initial Discharge Assessment       Primary Care Provider: Antonette Leahy MD    Admission Diagnosis: Abscess of left axilla [L02.412]    Admission Date: 9/23/2023  Expected Discharge Date:     Transition of Care Barriers: Unable to afford medication    Payor: MEDICAID / Plan: [employer] / Product Type: Managed Medicaid /     Extended Emergency Contact Information  Primary Emergency Contact: Ilana Colindresa  Address: [address] [place] [address] [address] [place], [place] [id number] [place] [address]  Home Phone: [phone]  Relation: Grandparent    Discharge Plan A: Home  Discharge Plan B: Home with family    No Pharmacies Listed    Initial Assessment (most recent)       Adult Discharge Assessment - 09/24/23 1040          Discharge Assessment    Assessment Type Discharge Planning Assessment     Confirmed/corrected address, phone number and insurance Yes     Confirmed Demographics Correct on Facesheet     Source of Information patient     When was your last doctors appointment? 06/01/23     Does patient/caregiver understand observation status Yes     Reason For Admission effects of  hidradenitis     People in Home significant other     Do you expect to return to your current living situation? Yes     Do you have help at home or someone to help you manage your care at home? No     Prior to hospitilization cognitive status: Alert/Oriented     Current cognitive status: Alert/Oriented     Home Accessibility not wheelchair accessible     Home Layout Able to live on 1st floor     Equipment Currently Used at Home none     Readmission within 30 days? No     Patient currently being followed by outpatient case management? No     Do you currently have service(s) that help you manage your care at home? No     Do you take prescription medications? Yes     Do you have prescription coverage? Yes     Coverage [employer]     Do you have any problems affording any of your prescribed medications? Yes     If yes,  what medications? Meds for  hidradenitis are not covered per pt. She must pay out of pocket for those     Is the patient taking medications as prescribed? no     If no, which medications is patient not taking? Meds for  hidradenitis are not covered per pt. She must pay out of pocket for those     Who is going to help you get home at discharge? Self or SO     How do you get to doctors appointments? car, drives self     Are you on dialysis? No     Do you take coumadin? No     DME Needed Upon Discharge  none     Discharge Plan discussed with: Patient     Transition of Care Barriers Unable to afford medication     Discharge Plan A Home     Discharge Plan B Home with family                   Swer met with pt at bedside to complete d/c assessment. Pt reports no needs but unable to afford meds for HS due to it not being covered by insurance. CM provided a transitional care folder, information on advanced directives, information on pharmacy bedside delivery, and discharge planning begins on admission with contact information for any needs/questions.

## 2023-09-24 NOTE — ASSESSMENT & PLAN NOTE
-per patient, has been on suppressive therapy with doxycycline for the past few months, followed by Dermatology.

## 2023-09-24 NOTE — HPI
Ms. Farr is a 23-year-old  female with PMH significant for hidradenitis suppurativa, has been on doxycycline daily since July 2023 for suppressive treatment, followed by dermatology, presented to the ED complaining of worsening swelling, pain in the left axillary region, extending to the left chest, associated with low-grade fever.  Afebrile in the ED, , WBC 07998, no bandemia lactic acidosis.  CT shoulder without contrast reveals soft tissue mass along the axillary region.  ED physician discussed case with General surgery on-call, recommended placing in observation for possible I&D in a.m..  Patient started on IV vancomycin, cefepime after obtaining cultures.      Placed in observation for left axillary abscess, history of suppurative hidradenitis.

## 2023-09-24 NOTE — CONSULTS
[facility]  General Surgery  Consult Note    Patient Name: Barby Farr  MRN: [id number]  Code Status: Full Code  Admission Date: 9/23/2023  Hospital Length of Stay: 1 days  Attending Physician: Ena Kenney MD  Primary Care Provider: Antonette Leahy MD    Patient information was obtained from patient.     Inpatient consult to General Surgery  Consult performed by: Antonio Moseley MD  Consult ordered by: Kurt Marshall MD        Subjective:     Principal Problem: Abscess of left axilla    History of Present Illness: 23-year-old female referred for left axillary abscess.  She is been undergoing treatment for hidradenitis with recent pain and swelling that has not been improving.  No drainage.  CT scan showing significant inflammation of elective axilla.  She is not been on biologic medications for hidradenitis only antibiotic washes and oral antibiotics.      No current facility-administered medications on file prior to encounter.     No current outpatient medications on file prior to encounter.       Review of patient's allergies indicates:  No Known Allergies    Past Medical History:   Diagnosis Date    Asthma      History reviewed. No pertinent surgical history.  Family History    None       Tobacco Use    Smoking status: Never    Smokeless tobacco: Not on file   Substance and Sexual Activity    Alcohol use: No    Drug use: No    Sexual activity: Not on file     Review of Systems   Constitutional:  Negative for chills, fatigue, fever and unexpected weight change.   Respiratory:  Negative for cough, shortness of breath, wheezing and stridor.    Cardiovascular:  Negative for chest pain, palpitations and leg swelling.   Gastrointestinal:  Negative for abdominal distention, abdominal pain, constipation, diarrhea, nausea and vomiting.   Genitourinary:  Negative for difficulty urinating, dysuria, frequency, hematuria and urgency.   Skin:  Positive for wound. Negative for color change, pallor  and rash.   Hematological:  Does not bruise/bleed easily.     Objective:     Vital Signs (Most Recent):  Temp: 98.3 °F (36.8 °C) (09/24/23 0731)  Pulse: 98 (09/24/23 0731)  Resp: 16 (09/24/23 0845)  BP: (!) 132/91 (09/24/23 0731)  SpO2: 100 % (09/24/23 0731) Vital Signs (24h Range):  Temp:  [97.8 °F (36.6 °C)-98.4 °F (36.9 °C)] 98.3 °F (36.8 °C)  Pulse:  [] 98  Resp:  [16-20] 16  SpO2:  [100 %] 100 %  BP: (132-152)/(87-99) 132/91     Weight: 78.9 kg (173 lb 15.1 oz)  Body mass index is 30.81 kg/m².     Physical Exam  Vitals reviewed.   Constitutional:       Appearance: She is well-developed.   HENT:      Head: Normocephalic and atraumatic.   Cardiovascular:      Rate and Rhythm: Normal rate and regular rhythm.   Pulmonary:      Effort: Pulmonary effort is normal.      Breath sounds: Normal breath sounds.   Abdominal:      General: Bowel sounds are normal. There is no distension.      Palpations: Abdomen is soft.      Tenderness: There is no abdominal tenderness.   Musculoskeletal:      Cervical back: Neck supple.   Skin:     General: Skin is warm and dry.          Neurological:      Mental Status: She is alert and oriented to person, place, and time.            I have reviewed all pertinent lab results within the past 24 hours.  CBC:   Recent Labs   Lab 09/24/23  0420   WBC 17.16*   RBC 4.38   HGB 13.2   HCT 39.4      MCV 90   MCH 30.1   MCHC 33.5     BMP:   Recent Labs   Lab 09/24/23  0420         K 3.2*      CO2 24   BUN 7   CREATININE 0.6   CALCIUM 8.6*   MG 1.9       Significant Diagnostics:  CT:  FINDINGS:  Soft tissue mass along the axillary region.  Absence of IV contrast significantly degrades the exam.  It is unclear if this represents neoplasm or abscess.  Correlation with symptoms.     Local inflammatory stranding favors infection.  Skin thickening and suspected reactive lymphadenopathy.     Osseous structures grossly intact.  No fracture or traumatic malalignment.   Bipartite acromion.         Assessment/Plan:     * Abscess of left axilla  To OR for incision drainage left axilla  Npo, ivfs  Iv abx  Risks and benefits discussed with pain, bleeding, infection, scarring, need for further procedure    Hidradenitis suppurativa of left axilla  Patient follows with outpatient dermatology  Has been on antibiotic therapy  Considering biologic therapy  Keep outpatient follow up with her dermatologist      VTE Risk Mitigation (From admission, onward)         Ordered     Place sequential compression device  Until discontinued         09/23/23 6173                Thank you for your consult. I will follow-up with patient. Please contact us if you have any additional questions.    Antonio Moseley MD  General Surgery  [facility]

## 2023-09-25 LAB
ANION GAP SERPL CALC-SCNC: 12 MMOL/L (ref 8–16)
BASOPHILS # BLD AUTO: 0.05 K/UL (ref 0–0.2)
BASOPHILS NFR BLD: 0.3 % (ref 0–1.9)
BUN SERPL-MCNC: 4 MG/DL (ref 6–20)
CALCIUM SERPL-MCNC: 8.5 MG/DL (ref 8.7–10.5)
CHLORIDE SERPL-SCNC: 106 MMOL/L (ref 95–110)
CO2 SERPL-SCNC: 22 MMOL/L (ref 23–29)
CREAT SERPL-MCNC: 0.6 MG/DL (ref 0.5–1.4)
DIFFERENTIAL METHOD: ABNORMAL
EOSINOPHIL # BLD AUTO: 0.2 K/UL (ref 0–0.5)
EOSINOPHIL NFR BLD: 1.1 % (ref 0–8)
ERYTHROCYTE [DISTWIDTH] IN BLOOD BY AUTOMATED COUNT: 11.6 % (ref 11.5–14.5)
EST. GFR  (NO RACE VARIABLE): >60 ML/MIN/1.73 M^2
GLUCOSE SERPL-MCNC: 123 MG/DL (ref 70–110)
HCT VFR BLD AUTO: 37.6 % (ref 37–48.5)
HGB BLD-MCNC: 12.7 G/DL (ref 12–16)
IMM GRANULOCYTES # BLD AUTO: 0.07 K/UL (ref 0–0.04)
IMM GRANULOCYTES NFR BLD AUTO: 0.4 % (ref 0–0.5)
LYMPHOCYTES # BLD AUTO: 3.5 K/UL (ref 1–4.8)
LYMPHOCYTES NFR BLD: 19.9 % (ref 18–48)
MCH RBC QN AUTO: 31.1 PG (ref 27–31)
MCHC RBC AUTO-ENTMCNC: 33.8 G/DL (ref 32–36)
MCV RBC AUTO: 92 FL (ref 82–98)
MONOCYTES # BLD AUTO: 0.7 K/UL (ref 0.3–1)
MONOCYTES NFR BLD: 4 % (ref 4–15)
MRSA ID BY PCR: NEGATIVE
NEUTROPHILS # BLD AUTO: 13.2 K/UL (ref 1.8–7.7)
NEUTROPHILS NFR BLD: 74.3 % (ref 38–73)
NRBC BLD-RTO: 0 /100 WBC
PLATELET # BLD AUTO: 248 K/UL (ref 150–450)
PMV BLD AUTO: 10.7 FL (ref 9.2–12.9)
POTASSIUM SERPL-SCNC: 3.4 MMOL/L (ref 3.5–5.1)
RBC # BLD AUTO: 4.09 M/UL (ref 4–5.4)
SODIUM SERPL-SCNC: 140 MMOL/L (ref 136–145)
STAPH AUREUS ID BY PCR: NEGATIVE
WBC # BLD AUTO: 17.78 K/UL (ref 3.9–12.7)

## 2023-09-25 PROCEDURE — 99223 1ST HOSP IP/OBS HIGH 75: CPT | Mod: NSCH,,, | Performed by: INTERNAL MEDICINE

## 2023-09-25 PROCEDURE — 85025 COMPLETE CBC W/AUTO DIFF WBC: CPT | Performed by: FAMILY MEDICINE

## 2023-09-25 PROCEDURE — 36415 COLL VENOUS BLD VENIPUNCTURE: CPT | Performed by: FAMILY MEDICINE

## 2023-09-25 PROCEDURE — 25000003 PHARM REV CODE 250: Performed by: FAMILY MEDICINE

## 2023-09-25 PROCEDURE — 25000003 PHARM REV CODE 250: Performed by: SURGERY

## 2023-09-25 PROCEDURE — 63600175 PHARM REV CODE 636 W HCPCS

## 2023-09-25 PROCEDURE — 80048 BASIC METABOLIC PNL TOTAL CA: CPT | Performed by: FAMILY MEDICINE

## 2023-09-25 PROCEDURE — 63600175 PHARM REV CODE 636 W HCPCS: Performed by: NURSE PRACTITIONER

## 2023-09-25 PROCEDURE — 63600175 PHARM REV CODE 636 W HCPCS: Performed by: SURGERY

## 2023-09-25 PROCEDURE — 11000001 HC ACUTE MED/SURG PRIVATE ROOM

## 2023-09-25 PROCEDURE — 25000003 PHARM REV CODE 250: Performed by: NURSE PRACTITIONER

## 2023-09-25 PROCEDURE — 99223 PR INITIAL HOSPITAL CARE,LEVL III: ICD-10-PCS | Mod: NSCH,,, | Performed by: INTERNAL MEDICINE

## 2023-09-25 PROCEDURE — 63600175 PHARM REV CODE 636 W HCPCS: Performed by: FAMILY MEDICINE

## 2023-09-25 RX ORDER — MORPHINE SULFATE 2 MG/ML
2 INJECTION, SOLUTION INTRAMUSCULAR; INTRAVENOUS ONCE
Status: COMPLETED | OUTPATIENT
Start: 2023-09-25 | End: 2023-09-25

## 2023-09-25 RX ORDER — POTASSIUM CHLORIDE 20 MEQ/1
40 TABLET, EXTENDED RELEASE ORAL ONCE
Status: COMPLETED | OUTPATIENT
Start: 2023-09-25 | End: 2023-09-25

## 2023-09-25 RX ORDER — KETOROLAC TROMETHAMINE 30 MG/ML
15 INJECTION, SOLUTION INTRAMUSCULAR; INTRAVENOUS ONCE
Status: COMPLETED | OUTPATIENT
Start: 2023-09-25 | End: 2023-09-25

## 2023-09-25 RX ORDER — SODIUM CHLORIDE 9 MG/ML
INJECTION, SOLUTION INTRAVENOUS
Status: DISCONTINUED | OUTPATIENT
Start: 2023-09-25 | End: 2023-09-26 | Stop reason: HOSPADM

## 2023-09-25 RX ADMIN — HYDROCODONE BITARTRATE AND ACETAMINOPHEN 1 TABLET: 10; 325 TABLET ORAL at 06:09

## 2023-09-25 RX ADMIN — CEFEPIME 1 G: 1 INJECTION, POWDER, FOR SOLUTION INTRAMUSCULAR; INTRAVENOUS at 11:09

## 2023-09-25 RX ADMIN — VANCOMYCIN HYDROCHLORIDE 1750 MG: 500 INJECTION, POWDER, LYOPHILIZED, FOR SOLUTION INTRAVENOUS at 02:09

## 2023-09-25 RX ADMIN — POTASSIUM CHLORIDE 40 MEQ: 1500 TABLET, EXTENDED RELEASE ORAL at 10:09

## 2023-09-25 RX ADMIN — KETOROLAC TROMETHAMINE 15 MG: 30 INJECTION, SOLUTION INTRAMUSCULAR; INTRAVENOUS at 10:09

## 2023-09-25 RX ADMIN — VANCOMYCIN HYDROCHLORIDE 1250 MG: 1.25 INJECTION, POWDER, LYOPHILIZED, FOR SOLUTION INTRAVENOUS at 02:09

## 2023-09-25 RX ADMIN — CEFEPIME 1 G: 1 INJECTION, POWDER, FOR SOLUTION INTRAMUSCULAR; INTRAVENOUS at 07:09

## 2023-09-25 RX ADMIN — HYDROCODONE BITARTRATE AND ACETAMINOPHEN 1 TABLET: 10; 325 TABLET ORAL at 09:09

## 2023-09-25 RX ADMIN — CEFEPIME 1 G: 1 INJECTION, POWDER, FOR SOLUTION INTRAMUSCULAR; INTRAVENOUS at 04:09

## 2023-09-25 RX ADMIN — CEFEPIME 1 G: 1 INJECTION, POWDER, FOR SOLUTION INTRAMUSCULAR; INTRAVENOUS at 12:09

## 2023-09-25 RX ADMIN — HYDROCODONE BITARTRATE AND ACETAMINOPHEN 1 TABLET: 10; 325 TABLET ORAL at 04:09

## 2023-09-25 RX ADMIN — MORPHINE SULFATE 2 MG: 2 INJECTION, SOLUTION INTRAMUSCULAR; INTRAVENOUS at 12:09

## 2023-09-25 NOTE — PROGRESS NOTES
[facility]  General Surgery  Progress Note    Subjective:     History of Present Illness:  23-year-old female referred for left axillary abscess.  She is been undergoing treatment for hidradenitis with recent pain and swelling that has not been improving.  No drainage.  CT scan showing significant inflammation of elective axilla.  She is not been on biologic medications for hidradenitis only antibiotic washes and oral antibiotics.      Post-Op Info:  Procedure(s) (LRB):  INCISION AND DRAINAGE, ABSCESS (Left)   1 Day Post-Op     Interval History:  Still with some pressure and pain of left axilla but overall improved.  No spreading cellulitis.  Tolerating diet.    Medications:  Continuous Infusions:  Scheduled Meds:   ceFEPime (MAXIPIME) IVPB  1 g Intravenous Q8H    vancomycin (VANCOCIN) IV (PEDS and ADULTS)  1,250 mg Intravenous Q12H     PRN Meds:acetaminophen, albuterol-ipratropium, aluminum-magnesium hydroxide-simethicone, guaiFENesin 100 mg/5 ml, hydrALAZINE, HYDROcodone-acetaminophen, morphine, ondansetron, Pharmacy to dose Vancomycin consult **AND** vancomycin - pharmacy to dose     Review of patient's allergies indicates:  No Known Allergies  Objective:     Vital Signs (Most Recent):  Temp: 98.2 °F (36.8 °C) (09/25/23 0730)  Pulse: 72 (09/25/23 0730)  Resp: 18 (09/25/23 0730)  BP: 132/75 (09/25/23 0730)  SpO2: 100 % (09/25/23 0730) Vital Signs (24h Range):  Temp:  [97.5 °F (36.4 °C)-99.6 °F (37.6 °C)] 98.2 °F (36.8 °C)  Pulse:  [] 72  Resp:  [16-20] 18  SpO2:  [96 %-100 %] 100 %  BP: (112-137)/(70-81) 132/75     Weight: 78.9 kg (173 lb 15.1 oz)  Body mass index is 30.81 kg/m².    Intake/Output - Last 3 Shifts         09/23 0700 09/24 0659 09/24 0700 09/25 0659 09/25 0700 09/26 0659    P.O.  720     I.V. (mL/kg)  2230.5 (28.3)     IV Piggyback  1628     Total Intake(mL/kg)  4578.5 (58)     Net  +4578.5            Urine Occurrence  3 x              Physical Exam  Vitals and nursing note  reviewed.   Constitutional:       General: She is not in acute distress.     Appearance: She is well-developed. She is not ill-appearing.   HENT:      Head: Normocephalic and atraumatic.      Right Ear: External ear normal.      Left Ear: External ear normal.      Nose: Nose normal.      Mouth/Throat:      Mouth: Mucous membranes are moist.   Eyes:      Extraocular Movements: Extraocular movements intact.      Conjunctiva/sclera: Conjunctivae normal.   Cardiovascular:      Rate and Rhythm: Normal rate.   Pulmonary:      Effort: Pulmonary effort is normal. No respiratory distress.   Musculoskeletal:      Cervical back: Neck supple.   Skin:     General: Skin is warm and dry.      Comments: Left axilla: Surgical dressing in place, clean and dry.  No spreading erythema or induration surrounding dressing.   Neurological:      Mental Status: She is alert and oriented to person, place, and time.   Psychiatric:         Behavior: Behavior normal.          Significant Labs:  I have reviewed all pertinent lab results within the past 24 hours.  CBC:   Recent Labs   Lab 09/25/23  0900   WBC 17.78*   RBC 4.09   HGB 12.7   HCT 37.6      MCV 92   MCH 31.1*   MCHC 33.8     CMP:   Recent Labs   Lab 09/24/23  0420      CALCIUM 8.6*   ALBUMIN 3.3*   PROT 7.1      K 3.2*   CO2 24      BUN 7   CREATININE 0.6   ALKPHOS 84   ALT 23   AST 17   BILITOT 0.4       Significant Diagnostics:  I have reviewed all pertinent imaging results/findings within the past 24 hours.  No new pertinent imaging    Assessment/Plan:     * Abscess of left axilla  Status post incision and drainage, postop day 1    Remove packing today.  Wound does not need to be repacked, as patient has Penrose drain in place.  Penrose drain to remain in place with 10-14 day outpatient surgical follow up for removal.  Stable for discharge from surgical standpoint with course of oral antibiotics.    Hidradenitis suppurativa of left axilla  Patient follows  with outpatient dermatology  Has been on antibiotic therapy  Considering biologic therapy  Keep outpatient follow up with her dermatologist        Julee Herr PA-C  General Surgery  [facility]

## 2023-09-25 NOTE — PLAN OF CARE
Care plan reviewed with patient. Pain managed by PRN med. Packing removed. Free from falls. No other complaints made. All orders checked and reviewed.

## 2023-09-25 NOTE — SUBJECTIVE & OBJECTIVE
Interval History:  Still with some pressure and pain of left axilla but overall improved.  No spreading cellulitis.  Tolerating diet.    Medications:  Continuous Infusions:  Scheduled Meds:   ceFEPime (MAXIPIME) IVPB  1 g Intravenous Q8H    vancomycin (VANCOCIN) IV (PEDS and ADULTS)  1,250 mg Intravenous Q12H     PRN Meds:acetaminophen, albuterol-ipratropium, aluminum-magnesium hydroxide-simethicone, guaiFENesin 100 mg/5 ml, hydrALAZINE, HYDROcodone-acetaminophen, morphine, ondansetron, Pharmacy to dose Vancomycin consult **AND** vancomycin - pharmacy to dose     Review of patient's allergies indicates:  No Known Allergies  Objective:     Vital Signs (Most Recent):  Temp: 98.2 °F (36.8 °C) (09/25/23 0730)  Pulse: 72 (09/25/23 0730)  Resp: 18 (09/25/23 0730)  BP: 132/75 (09/25/23 0730)  SpO2: 100 % (09/25/23 0730) Vital Signs (24h Range):  Temp:  [97.5 °F (36.4 °C)-99.6 °F (37.6 °C)] 98.2 °F (36.8 °C)  Pulse:  [] 72  Resp:  [16-20] 18  SpO2:  [96 %-100 %] 100 %  BP: (112-137)/(70-81) 132/75     Weight: 78.9 kg (173 lb 15.1 oz)  Body mass index is 30.81 kg/m².    Intake/Output - Last 3 Shifts         09/23 0700 09/24 0659 09/24 0700 09/25 0659 09/25 0700 09/26 0659    P.O.  720     I.V. (mL/kg)  2230.5 (28.3)     IV Piggyback  1628     Total Intake(mL/kg)  4578.5 (58)     Net  +4578.5            Urine Occurrence  3 x              Physical Exam  Vitals and nursing note reviewed.   Constitutional:       General: She is not in acute distress.     Appearance: She is well-developed. She is not ill-appearing.   HENT:      Head: Normocephalic and atraumatic.      Right Ear: External ear normal.      Left Ear: External ear normal.      Nose: Nose normal.      Mouth/Throat:      Mouth: Mucous membranes are moist.   Eyes:      Extraocular Movements: Extraocular movements intact.      Conjunctiva/sclera: Conjunctivae normal.   Cardiovascular:      Rate and Rhythm: Normal rate.   Pulmonary:      Effort: Pulmonary effort  is normal. No respiratory distress.   Musculoskeletal:      Cervical back: Neck supple.   Skin:     General: Skin is warm and dry.      Comments: Left axilla: Surgical dressing in place, clean and dry.  No spreading erythema or induration surrounding dressing.   Neurological:      Mental Status: She is alert and oriented to person, place, and time.   Psychiatric:         Behavior: Behavior normal.          Significant Labs:  I have reviewed all pertinent lab results within the past 24 hours.  CBC:   Recent Labs   Lab 09/25/23  0900   WBC 17.78*   RBC 4.09   HGB 12.7   HCT 37.6      MCV 92   MCH 31.1*   MCHC 33.8     CMP:   Recent Labs   Lab 09/24/23  0420      CALCIUM 8.6*   ALBUMIN 3.3*   PROT 7.1      K 3.2*   CO2 24      BUN 7   CREATININE 0.6   ALKPHOS 84   ALT 23   AST 17   BILITOT 0.4       Significant Diagnostics:  I have reviewed all pertinent imaging results/findings within the past 24 hours.  No new pertinent imaging

## 2023-09-25 NOTE — ASSESSMENT & PLAN NOTE
Status post incision and drainage, postop day 1    Remove packing today.  Wound does not need to be repacked, as patient has Penrose drain in place.  Penrose drain to remain in place with 10-14 day outpatient surgical follow up for removal.  Stable for discharge from surgical standpoint with course of oral antibiotics.

## 2023-09-25 NOTE — PROGRESS NOTES
Pharmacokinetic Initial Assessment: IV Vancomycin    Assessment/Plan:    Initiate intravenous vancomycin with loading dose of 1750 mg once followed by a maintenance dose of vancomycin 1250 mg IV every 12 hours  Desired empiric serum trough concentration is 15 to 20 mcg/mL  Draw vancomycin trough level 60 min prior to fourth dose on 9/26/23 at approximately 1300.  Pharmacy will continue to follow and monitor vancomycin.      Please contact pharmacy at extension [phone] with any questions regarding this assessment.     Thank you for the consult,   Olaf Vasquez       Patient brief summary:  Barby Farr is a 23 y.o. female initiated on antimicrobial therapy with IV Vancomycin for treatment of suspected bacteremia    Drug Allergies:   Review of patient's allergies indicates:  No Known Allergies    Actual Body Weight:   78.9 kg    Renal Function:   Estimated Creatinine Clearance: 145 mL/min (based on SCr of 0.6 mg/dL).,     Dialysis Method (if applicable):  N/A    CBC (last 72 hours):  Recent Labs   Lab Result Units 09/23/23 1958 09/24/23  0420   WBC K/uL 16.88* 17.16*   Hemoglobin g/dL 14.3 13.2   Hematocrit % 41.8 39.4   Platelets K/uL 292 283   Gran % % 72.7 67.6   Lymph % % 21.1 25.0   Mono % % 4.5 5.1   Eosinophil % % 0.8 1.5   Basophil % % 0.4 0.3   Differential Method  Automated Automated       Metabolic Panel (last 72 hours):  Recent Labs   Lab Result Units 09/23/23 1958 09/24/23  0420   Sodium mmol/L 137 139   Potassium mmol/L 3.4* 3.2*   Chloride mmol/L 103 109   CO2 mmol/L 18* 24   Glucose mg/dL 102 102   BUN mg/dL 6 7   Creatinine mg/dL 0.6 0.6   Albumin g/dL 3.7 3.3*   Total Bilirubin mg/dL 0.5 0.4   Alkaline Phosphatase U/L 96 84   AST U/L 21 17   ALT U/L 28 23   Magnesium mg/dL  --  1.9         Microbiologic Results:  Microbiology Results (last 7 days)       Procedure Component Value Units Date/Time    MRSA/SA Rapid ID by PCR from Blood culture [[id number]] Collected: 09/23/23 1959    Order Status:  Completed Updated: 09/25/23 0214     Staph aureus ID by PCR Negative     Methicillin Resistant ID by PCR Negative    Blood Culture #1 **CANNOT BE ORDERED STAT** [[id number]] Collected: 09/23/23 1959    Order Status: Completed Specimen: Blood from Peripheral, Antecubital, Right Updated: 09/25/23 0056     Blood Culture, Routine Gram stain aer bottle: Gram positive cocci in clusters resembling Staph      Results called to and read back by: Gely Law RN 09/25/2023  00:55    Blood Culture #2 **CANNOT BE ORDERED STAT** [[id number]] Collected: 09/23/23 1959    Order Status: Completed Specimen: Blood from Peripheral, Forearm, Right Updated: 09/24/23 0915     Blood Culture, Routine No Growth to date

## 2023-09-25 NOTE — SUBJECTIVE & OBJECTIVE
Interval History: Patient was doing well this morning, but had severe pain while packing was being removed.  Her WBC is also still 170,000.  Dr. Burden was consulted for outpatient treatment options given history of recurrent infection.  Will repeat CBC in AM.    Review of Systems  Objective:     Vital Signs (Most Recent):  Temp: 98.9 °F (37.2 °C) (09/25/23 1209)  Pulse: 74 (09/25/23 1209)  Resp: 16 (09/25/23 1224)  BP: (!) 128/90 (09/25/23 1209)  SpO2: 98 % (09/25/23 1209) Vital Signs (24h Range):  Temp:  [97.5 °F (36.4 °C)-98.9 °F (37.2 °C)] 98.9 °F (37.2 °C)  Pulse:  [68-94] 74  Resp:  [15-19] 16  SpO2:  [97 %-100 %] 98 %  BP: (113-135)/(70-90) 128/90     Weight: 78.9 kg (173 lb 15.1 oz)  Body mass index is 30.81 kg/m².    Intake/Output Summary (Last 24 hours) at 9/25/2023 1308  Last data filed at 9/25/2023 0417  Gross per 24 hour   Intake 2562.37 ml   Output --   Net 2562.37 ml         Physical Exam  Vitals and nursing note reviewed.   Constitutional:       Appearance: Normal appearance.   HENT:      Head: Normocephalic and atraumatic.      Nose: Nose normal.      Mouth/Throat:      Mouth: Mucous membranes are moist.   Eyes:      Extraocular Movements: Extraocular movements intact.      Conjunctiva/sclera: Conjunctivae normal.   Cardiovascular:      Rate and Rhythm: Normal rate and regular rhythm.      Pulses: Normal pulses.      Heart sounds: Normal heart sounds.   Pulmonary:      Effort: Pulmonary effort is normal.      Breath sounds: Normal breath sounds.   Abdominal:      General: Abdomen is flat. Bowel sounds are normal.      Palpations: Abdomen is soft.   Musculoskeletal:         General: Tenderness present.      Cervical back: Normal range of motion and neck supple.      Comments: Left axilla dressing CDI.  Tenderness over surgical site.   Skin:     General: Skin is warm.      Capillary Refill: Capillary refill takes less than 2 seconds.   Neurological:      Mental Status: She is alert. Mental status is at  baseline.   Psychiatric:         Mood and Affect: Mood normal.         Behavior: Behavior normal.             Significant Labs: All pertinent labs within the past 24 hours have been reviewed.  Recent Lab Results         09/25/23  0900        Anion Gap 12       Baso # 0.05       Basophil % 0.3       BUN 4       Calcium 8.5       Chloride 106       CO2 22       Creatinine 0.6       Differential Method Automated       eGFR >60       Eos # 0.2       Eosinophil % 1.1       Glucose 123       Gran # (ANC) 13.2       Gran % 74.3       Hematocrit 37.6       Hemoglobin 12.7       Immature Grans (Abs) 0.07  Comment: Mild elevation in immature granulocytes is non specific and   can be seen in a variety of conditions including stress response,   acute inflammation, trauma and pregnancy. Correlation with other   laboratory and clinical findings is essential.         Immature Granulocytes 0.4       Lymph # 3.5       Lymph % 19.9       MCH 31.1       MCHC 33.8       MCV 92       Mono # 0.7       Mono % 4.0       MPV 10.7       nRBC 0       Platelets 248       Potassium 3.4       RBC 4.09       RDW 11.6       Sodium 140       WBC 17.78               Significant Imaging:   CT Shoulder Without Contrast Left   Final Result   Abnormal      As above.      This report was flagged in Epic as abnormal.      All CT scans at this facility are performed  using dose modulation techniques as appropriate to performed exam including the following:  automated exposure control; adjustment of mA and/or kV according to the patients size (this includes techniques or standardized protocols for targeted exams where dose is matched to indication/reason for exam: i.e. extremities or head);  iterative reconstruction technique.         Electronically signed by: Freddie Boudreaux   Date:    09/23/2023   Time:    20:16

## 2023-09-25 NOTE — PROGRESS NOTES
[facility] Surg 3  [facility] Medicine  Progress Note    Patient Name: Barby Farr  MRN: [id number]  Patient Class: IP- Inpatient   Admission Date: 9/23/2023  Length of Stay: 2 days  Attending Physician: Nehal Hernandez MD  Primary Care Provider: Antonette Leahy MD        Subjective:     Principal Problem:Abscess of left axilla        HPI:  Ms. Farr is a 23-year-old  female with PMH significant for hidradenitis suppurativa, has been on doxycycline daily since July 2023 for suppressive treatment, followed by dermatology, presented to the ED complaining of worsening swelling, pain in the left axillary region, extending to the left chest, associated with low-grade fever.  Afebrile in the ED, , WBC 65612, no bandemia lactic acidosis.  CT shoulder without contrast reveals soft tissue mass along the axillary region.  ED physician discussed case with General surgery on-call, recommended placing in observation for possible I&D in a.m..  Patient started on IV vancomycin, cefepime after obtaining cultures.      Placed in observation for left axillary abscess, history of suppurative hidradenitis.      Overview/Hospital Course:  The patient presented with  left axillary abscess. She was placed on IV antibiotics due to significant leukocytosis in presence of oral antibiotics. Surgery was consulted and patient underwent I&D with penrose placement.       Interval History: Patient was doing well this morning, but had severe pain while packing was being removed.  Her WBC is also still 170,000.  Dr. Burden was consulted for outpatient treatment options given history of recurrent infection.  Will repeat CBC in AM.    Review of Systems  Objective:     Vital Signs (Most Recent):  Temp: 98.9 °F (37.2 °C) (09/25/23 1209)  Pulse: 74 (09/25/23 1209)  Resp: 16 (09/25/23 1224)  BP: (!) 128/90 (09/25/23 1209)  SpO2: 98 % (09/25/23 1209) Vital Signs (24h Range):  Temp:  [97.5 °F (36.4 °C)-98.9 °F (37.2 °C)] 98.9 °F  (37.2 °C)  Pulse:  [68-94] 74  Resp:  [15-19] 16  SpO2:  [97 %-100 %] 98 %  BP: (113-135)/(70-90) 128/90     Weight: 78.9 kg (173 lb 15.1 oz)  Body mass index is 30.81 kg/m².    Intake/Output Summary (Last 24 hours) at 9/25/2023 1308  Last data filed at 9/25/2023 0417  Gross per 24 hour   Intake 2562.37 ml   Output --   Net 2562.37 ml         Physical Exam  Vitals and nursing note reviewed.   Constitutional:       Appearance: Normal appearance.   HENT:      Head: Normocephalic and atraumatic.      Nose: Nose normal.      Mouth/Throat:      Mouth: Mucous membranes are moist.   Eyes:      Extraocular Movements: Extraocular movements intact.      Conjunctiva/sclera: Conjunctivae normal.   Cardiovascular:      Rate and Rhythm: Normal rate and regular rhythm.      Pulses: Normal pulses.      Heart sounds: Normal heart sounds.   Pulmonary:      Effort: Pulmonary effort is normal.      Breath sounds: Normal breath sounds.   Abdominal:      General: Abdomen is flat. Bowel sounds are normal.      Palpations: Abdomen is soft.   Musculoskeletal:         General: Tenderness present.      Cervical back: Normal range of motion and neck supple.      Comments: Left axilla dressing CDI.  Tenderness over surgical site.   Skin:     General: Skin is warm.      Capillary Refill: Capillary refill takes less than 2 seconds.   Neurological:      Mental Status: She is alert. Mental status is at baseline.   Psychiatric:         Mood and Affect: Mood normal.         Behavior: Behavior normal.             Significant Labs: All pertinent labs within the past 24 hours have been reviewed.  Recent Lab Results         09/25/23  0900        Anion Gap 12       Baso # 0.05       Basophil % 0.3       BUN 4       Calcium 8.5       Chloride 106       CO2 22       Creatinine 0.6       Differential Method Automated       eGFR >60       Eos # 0.2       Eosinophil % 1.1       Glucose 123       Gran # (ANC) 13.2       Gran % 74.3       Hematocrit 37.6        Hemoglobin 12.7       Immature Grans (Abs) 0.07  Comment: Mild elevation in immature granulocytes is non specific and   can be seen in a variety of conditions including stress response,   acute inflammation, trauma and pregnancy. Correlation with other   laboratory and clinical findings is essential.         Immature Granulocytes 0.4       Lymph # 3.5       Lymph % 19.9       MCH 31.1       MCHC 33.8       MCV 92       Mono # 0.7       Mono % 4.0       MPV 10.7       nRBC 0       Platelets 248       Potassium 3.4       RBC 4.09       RDW 11.6       Sodium 140       WBC 17.78               Significant Imaging:   CT Shoulder Without Contrast Left   Final Result   Abnormal      As above.      This report was flagged in Epic as abnormal.      All CT scans at this facility are performed  using dose modulation techniques as appropriate to performed exam including the following:  automated exposure control; adjustment of mA and/or kV according to the patients size (this includes techniques or standardized protocols for targeted exams where dose is matched to indication/reason for exam: i.e. extremities or head);  iterative reconstruction technique.         Electronically signed by: Freddie Boudreaux   Date:    09/23/2023   Time:    20:16             Assessment/Plan:      * Abscess of left axilla  -s/p I&D with Dr. Moseley on 9/24.  Remove packing on POD #1.  Penrose drain to remain in place with 10-14 day outpatient surgical follow up for removal.  -continue IV vancomycin, cefepime.  -ID consulted for outpatient antibiotic management.  -monitor drainage      Hidradenitis suppurativa of left axilla  -per patient, has been on suppressive therapy with doxycycline for the past few months, followed by Dermatology.      SIRS (systemic inflammatory response syndrome)  -secondary to left axillary abscess/hidradenitis suppurative.    -continue IV vancomycin, cefepime.    -continue fluids.    -hemodynamically stable.        VTE Risk  Mitigation (From admission, onward)         Ordered     Place sequential compression device  Until discontinued         09/23/23 2133                Discharge Planning   RONNELL:      Code Status: Full Code   Is the patient medically ready for discharge?:     Reason for patient still in hospital (select all that apply): Patient trending condition, Laboratory test and Consult recommendations  Discharge Plan A: Home                  Nehal Hernandez MD  Department of Hospital Medicine   [facility] - Med Surg 3

## 2023-09-25 NOTE — ASSESSMENT & PLAN NOTE
-s/p I&D with Dr. Moseley on 9/24.  Remove packing on POD #1.  Penrose drain to remain in place with 10-14 day outpatient surgical follow up for removal.  -continue IV vancomycin, cefepime.  -ID consulted for outpatient antibiotic management.  -monitor drainage

## 2023-09-25 NOTE — PLAN OF CARE
Problem: Adult Inpatient Plan of Care  Goal: Plan of Care Review  Outcome: Ongoing, Progressing  Flowsheets (Taken 9/25/2023 0420)  Plan of Care Reviewed With: patient     Problem: Infection  Goal: Absence of Infection Signs and Symptoms  Outcome: Ongoing, Progressing  Intervention: Prevent or Manage Infection  Flowsheets (Taken 9/25/2023 0420)  Infection Management: (iv antibiotics) other (see comments)

## 2023-09-26 VITALS
DIASTOLIC BLOOD PRESSURE: 69 MMHG | RESPIRATION RATE: 18 BRPM | OXYGEN SATURATION: 100 % | WEIGHT: 173.94 LBS | HEART RATE: 67 BPM | HEIGHT: 63 IN | TEMPERATURE: 98 F | BODY MASS INDEX: 30.82 KG/M2 | SYSTOLIC BLOOD PRESSURE: 133 MMHG

## 2023-09-26 PROBLEM — R78.81 BACTEREMIA: Status: ACTIVE | Noted: 2023-09-26

## 2023-09-26 LAB
ANION GAP SERPL CALC-SCNC: 8 MMOL/L (ref 8–16)
BASOPHILS # BLD AUTO: 0.05 K/UL (ref 0–0.2)
BASOPHILS NFR BLD: 0.4 % (ref 0–1.9)
BUN SERPL-MCNC: 9 MG/DL (ref 6–20)
CALCIUM SERPL-MCNC: 8.3 MG/DL (ref 8.7–10.5)
CHLORIDE SERPL-SCNC: 108 MMOL/L (ref 95–110)
CO2 SERPL-SCNC: 23 MMOL/L (ref 23–29)
CREAT SERPL-MCNC: 0.6 MG/DL (ref 0.5–1.4)
DIFFERENTIAL METHOD: ABNORMAL
EOSINOPHIL # BLD AUTO: 0.4 K/UL (ref 0–0.5)
EOSINOPHIL NFR BLD: 3 % (ref 0–8)
ERYTHROCYTE [DISTWIDTH] IN BLOOD BY AUTOMATED COUNT: 11.8 % (ref 11.5–14.5)
EST. GFR  (NO RACE VARIABLE): >60 ML/MIN/1.73 M^2
GLUCOSE SERPL-MCNC: 119 MG/DL (ref 70–110)
HCT VFR BLD AUTO: 37.2 % (ref 37–48.5)
HGB BLD-MCNC: 12 G/DL (ref 12–16)
IMM GRANULOCYTES # BLD AUTO: 0.03 K/UL (ref 0–0.04)
IMM GRANULOCYTES NFR BLD AUTO: 0.2 % (ref 0–0.5)
LYMPHOCYTES # BLD AUTO: 3.7 K/UL (ref 1–4.8)
LYMPHOCYTES NFR BLD: 30.5 % (ref 18–48)
MCH RBC QN AUTO: 30.6 PG (ref 27–31)
MCHC RBC AUTO-ENTMCNC: 32.3 G/DL (ref 32–36)
MCV RBC AUTO: 95 FL (ref 82–98)
MONOCYTES # BLD AUTO: 0.5 K/UL (ref 0.3–1)
MONOCYTES NFR BLD: 4 % (ref 4–15)
NEUTROPHILS # BLD AUTO: 7.5 K/UL (ref 1.8–7.7)
NEUTROPHILS NFR BLD: 61.9 % (ref 38–73)
NRBC BLD-RTO: 0 /100 WBC
PLATELET # BLD AUTO: 265 K/UL (ref 150–450)
PMV BLD AUTO: 11.1 FL (ref 9.2–12.9)
POTASSIUM SERPL-SCNC: 3.1 MMOL/L (ref 3.5–5.1)
RBC # BLD AUTO: 3.92 M/UL (ref 4–5.4)
SODIUM SERPL-SCNC: 139 MMOL/L (ref 136–145)
WBC # BLD AUTO: 12.15 K/UL (ref 3.9–12.7)

## 2023-09-26 PROCEDURE — 63600175 PHARM REV CODE 636 W HCPCS: Performed by: FAMILY MEDICINE

## 2023-09-26 PROCEDURE — 63600175 PHARM REV CODE 636 W HCPCS: Performed by: SURGERY

## 2023-09-26 PROCEDURE — 63600175 PHARM REV CODE 636 W HCPCS: Performed by: NURSE PRACTITIONER

## 2023-09-26 PROCEDURE — 25000003 PHARM REV CODE 250: Performed by: SURGERY

## 2023-09-26 PROCEDURE — 36415 COLL VENOUS BLD VENIPUNCTURE: CPT | Performed by: FAMILY MEDICINE

## 2023-09-26 PROCEDURE — 85025 COMPLETE CBC W/AUTO DIFF WBC: CPT | Performed by: FAMILY MEDICINE

## 2023-09-26 PROCEDURE — 25000003 PHARM REV CODE 250: Performed by: NURSE PRACTITIONER

## 2023-09-26 PROCEDURE — 25000003 PHARM REV CODE 250: Performed by: FAMILY MEDICINE

## 2023-09-26 PROCEDURE — 80048 BASIC METABOLIC PNL TOTAL CA: CPT | Performed by: FAMILY MEDICINE

## 2023-09-26 RX ORDER — CEFAZOLIN SODIUM 2 G/50ML
2 SOLUTION INTRAVENOUS
Status: DISCONTINUED | OUTPATIENT
Start: 2023-09-26 | End: 2023-09-26 | Stop reason: HOSPADM

## 2023-09-26 RX ORDER — LINEZOLID 600 MG/1
600 TABLET, FILM COATED ORAL EVERY 12 HOURS
Qty: 20 TABLET | Refills: 0 | Status: SHIPPED | OUTPATIENT
Start: 2023-09-26

## 2023-09-26 RX ORDER — HYDROCODONE BITARTRATE AND ACETAMINOPHEN 10; 325 MG/1; MG/1
1 TABLET ORAL EVERY 6 HOURS PRN
Qty: 20 TABLET | Refills: 0 | Status: SHIPPED | OUTPATIENT
Start: 2023-09-26

## 2023-09-26 RX ORDER — POTASSIUM CHLORIDE 20 MEQ/1
40 TABLET, EXTENDED RELEASE ORAL DAILY
Status: DISCONTINUED | OUTPATIENT
Start: 2023-09-26 | End: 2023-09-26 | Stop reason: HOSPADM

## 2023-09-26 RX ORDER — MORPHINE SULFATE 2 MG/ML
2 INJECTION, SOLUTION INTRAMUSCULAR; INTRAVENOUS EVERY 4 HOURS PRN
Status: DISCONTINUED | OUTPATIENT
Start: 2023-09-26 | End: 2023-09-26 | Stop reason: HOSPADM

## 2023-09-26 RX ADMIN — HYDROCODONE BITARTRATE AND ACETAMINOPHEN 1 TABLET: 10; 325 TABLET ORAL at 12:09

## 2023-09-26 RX ADMIN — HYDROCODONE BITARTRATE AND ACETAMINOPHEN 1 TABLET: 10; 325 TABLET ORAL at 09:09

## 2023-09-26 RX ADMIN — HYDROCODONE BITARTRATE AND ACETAMINOPHEN 1 TABLET: 10; 325 TABLET ORAL at 05:09

## 2023-09-26 RX ADMIN — POTASSIUM CHLORIDE 40 MEQ: 1500 TABLET, EXTENDED RELEASE ORAL at 09:09

## 2023-09-26 RX ADMIN — CEFEPIME 1 G: 1 INJECTION, POWDER, FOR SOLUTION INTRAMUSCULAR; INTRAVENOUS at 09:09

## 2023-09-26 RX ADMIN — VANCOMYCIN HYDROCHLORIDE 1250 MG: 1.25 INJECTION, POWDER, LYOPHILIZED, FOR SOLUTION INTRAVENOUS at 02:09

## 2023-09-26 RX ADMIN — CEFAZOLIN SODIUM 2 G: 2 SOLUTION INTRAVENOUS at 04:09

## 2023-09-26 NOTE — PLAN OF CARE
Pt supine in bed looking at phone . IV abt's infusing , pt tolerating . Iv intact and safety measures in place . Chart check complete. Will continue to monitor .

## 2023-09-26 NOTE — ASSESSMENT & PLAN NOTE
-s/p I&D with Dr. Moseley on 9/24.  Remove packing on POD #1.  Penrose drain to remain in place with 10-14 day outpatient surgical follow up for removal.  - IV vancomycin, cefepime - changed to zyvox x 10 days   -ID consulted for outpatient antibiotic management.  -monitor drainage  - Leukocytosis resolved.

## 2023-09-26 NOTE — ASSESSMENT & PLAN NOTE
-s/p I&D with Dr. Moseley on 9/24.  Remove packing on POD #1.  Penrose drain to remain in place with 10-14 day outpatient surgical follow up for removal.  -continue IV vancomycin, cefepime.  -ID consulted for outpatient antibiotic management.  -monitor drainage  - Leukocytosis resolved.

## 2023-09-26 NOTE — PROGRESS NOTES
Therapy with vancomycin complete and/or consult discontinued by provider.    Pharmacy will sign off, please re-consult as needed.  [facility] located [facility] 9/26/2023 12:50 PM

## 2023-09-26 NOTE — ASSESSMENT & PLAN NOTE
- Blood cultures 9/23 1:2 bottles: Gram positive cocci in clusters resembling Staph (Contamninent)  - continue cefepime and vanc - change to Zyvox on discharge  - ID assisting with POC

## 2023-09-26 NOTE — ASSESSMENT & PLAN NOTE
-per patient, has been on suppressive therapy with doxycycline for the past few months, followed by Dermatology.  - F/U with Dr. Blanco outpatient.

## 2023-09-26 NOTE — HPI
23-year-old - PMH significant for hidradenitis suppurativa on doxycycline daily since July 2023 for suppressive treatment, followed by dermatology.  She was admitted for worsening swelling, pain in the left axillary region, extending to the left chest, associated with low-grade fever.     She had I and d done.  Cultures and imaging test -  WBC [id number] admit     CT shoulder without contrast reveals soft tissue mass along the axillary region.   Blood culture- 09/23- [facility]  Component      Latest Ref Rng 9/23/2023 9/24/2023 9/25/2023   WBC      3.90 - 12.70 K/uL 16.88 (H)  17.16 (H)  17.78 (H)       Legend:  (H) High

## 2023-09-26 NOTE — SUBJECTIVE & OBJECTIVE
Interval History: Patient doing much better this morning.  Pain better controlled.  Penrose will remain in place until follow up appt.  Blood cultures +, awaiting final cultures to determine outpatient antibiotic regimen.  ID assisting with POC.    Review of Systems  Objective:     Vital Signs (Most Recent):  Temp: 98.3 °F (36.8 °C) (09/26/23 0749)  Pulse: 88 (09/26/23 0749)  Resp: 16 (09/26/23 0905)  BP: (!) 150/79 (09/26/23 0749)  SpO2: 100 % (09/26/23 0749) Vital Signs (24h Range):  Temp:  [97.4 °F (36.3 °C)-98.9 °F (37.2 °C)] 98.3 °F (36.8 °C)  Pulse:  [68-88] 88  Resp:  [14-18] 16  SpO2:  [98 %-100 %] 100 %  BP: (108-150)/(56-90) 150/79     Weight: 78.9 kg (173 lb 15.1 oz)  Body mass index is 30.81 kg/m².    Intake/Output Summary (Last 24 hours) at 9/26/2023 1116  Last data filed at 9/26/2023 0418  Gross per 24 hour   Intake 788.55 ml   Output --   Net 788.55 ml         Physical Exam  Vitals and nursing note reviewed.   Constitutional:       Appearance: Normal appearance.   HENT:      Head: Normocephalic and atraumatic.      Nose: Nose normal.      Mouth/Throat:      Mouth: Mucous membranes are moist.   Eyes:      Extraocular Movements: Extraocular movements intact.      Conjunctiva/sclera: Conjunctivae normal.   Cardiovascular:      Rate and Rhythm: Normal rate and regular rhythm.      Pulses: Normal pulses.      Heart sounds: Normal heart sounds.   Pulmonary:      Effort: Pulmonary effort is normal.      Breath sounds: Normal breath sounds.   Abdominal:      General: Abdomen is flat. Bowel sounds are normal.      Palpations: Abdomen is soft.   Musculoskeletal:         General: Normal range of motion.      Cervical back: Normal range of motion and neck supple.      Comments: Left axilla dressing in place   Skin:     General: Skin is warm.      Capillary Refill: Capillary refill takes less than 2 seconds.   Neurological:      Mental Status: She is alert. Mental status is at baseline.   Psychiatric:         Mood  and Affect: Mood normal.         Behavior: Behavior normal.             Significant Labs: All pertinent labs within the past 24 hours have been reviewed.  Recent Lab Results         09/26/23  0402        Anion Gap 8       Baso # 0.05       Basophil % 0.4       BUN 9       Calcium 8.3       Chloride 108       CO2 23       Creatinine 0.6       Differential Method Automated       eGFR >60       Eos # 0.4       Eosinophil % 3.0       Glucose 119       Gran # (ANC) 7.5       Gran % 61.9       Hematocrit 37.2       Hemoglobin 12.0       Immature Grans (Abs) 0.03  Comment: Mild elevation in immature granulocytes is non specific and   can be seen in a variety of conditions including stress response,   acute inflammation, trauma and pregnancy. Correlation with other   laboratory and clinical findings is essential.         Immature Granulocytes 0.2       Lymph # 3.7       Lymph % 30.5       MCH 30.6       MCHC 32.3       MCV 95       Mono # 0.5       Mono % 4.0       MPV 11.1       nRBC 0       Platelets 265       Potassium 3.1       RBC 3.92       RDW 11.8       Sodium 139       WBC 12.15               Significant Imaging:   CT Shoulder Without Contrast Left   Final Result   Abnormal      As above.      This report was flagged in Epic as abnormal.      All CT scans at this facility are performed  using dose modulation techniques as appropriate to performed exam including the following:  automated exposure control; adjustment of mA and/or kV according to the patients size (this includes techniques or standardized protocols for targeted exams where dose is matched to indication/reason for exam: i.e. extremities or head);  iterative reconstruction technique.         Electronically signed by: Freddie Boudreaux   Date:    09/23/2023   Time:    20:16

## 2023-09-26 NOTE — CONSULTS
[facility] S[facility] 3  Infectious Disease  Consult Note    Patient Name: Barby Farr  MRN: [id number]  Admission Date: 9/23/2023  Hospital Length of Stay: 3 days  Attending Physician: Nehal Hernandez MD  Primary Care Provider: Antonette Leahy MD     Isolation Status: No active isolations    Patient information was obtained from patient, past medical records and ER records.      Consults  Assessment/Plan:     Derm  Hidradenitis suppurativa of left axilla  Will continue wound care.  On vanco/Cefepime    ID  * Abscess of left axilla  Will continue Vanco/Cefepime- follow final cultures to guide regime  S/p I and D    Bacteremia  Will follow Final ID of org ,on vanco        Thank you for your consult. I will follow-up with patient. Please contact us if you have any additional questions.    Blayne Burden MD, [facility]  Infectious Disease  [facility] - Med Surg 3    Subjective:     Principal Problem: Abscess of left axilla    HPI:    23-year-old - PMH significant for hidradenitis suppurativa on doxycycline daily since July 2023 for suppressive treatment, followed by dermatology.  She was admitted for worsening swelling, pain in the left axillary region, extending to the left chest, associated with low-grade fever.     She had I and d done.  Cultures and imaging test -  WBC [id number] admit     CT shoulder without contrast reveals soft tissue mass along the axillary region.   Blood culture- 09/23- [facility]  Component      Latest Ref Rng 9/23/2023 9/24/2023 9/25/2023   WBC      3.90 - 12.70 K/uL 16.88 (H)  17.16 (H)  17.78 (H)       Legend:  (H) High      Review of Systems   Constitutional: Negative.  Negative for chills.   HENT: Negative.  Negative for congestion, sore throat and trouble swallowing.    Eyes: Negative.    Respiratory: Negative.  Negative for cough, shortness of breath and wheezing.    Cardiovascular: Negative.  Negative for chest pain and palpitations.   Gastrointestinal: Negative.  Negative for abdominal pain,  diarrhea, nausea and vomiting.   Endocrine: Negative.    Genitourinary: Negative.  Negative for dysuria and flank pain.   Musculoskeletal: Negative.  Negative for back pain.   Skin:  Positive for wound (left axilla). Negative for rash.   Allergic/Immunologic: Negative.    Neurological: Negative.  Negative for speech difficulty, weakness, numbness and headaches.   Hematological: Negative.    Psychiatric/Behavioral: Negative.  Negative for hallucinations. The patient is not nervous/anxious.    All other systems reviewed and are negative.     Objective:      Vital Signs (Most Recent):  Temp: 98.4 °F (36.9 °C) (09/23/23 2116)  Pulse: 83 (09/23/23 2116)  Resp: 18 (09/23/23 2116)  BP: 136/87 (09/23/23 2116)  SpO2: 100 % (09/23/23 2116) Vital Signs (24h Range):  Temp:  [98 °F (36.7 °C)-98.4 °F (36.9 °C)] 98.4 °F (36.9 °C)  Pulse:  [] 83  Resp:  [18-20] 18  SpO2:  [100 %] 100 %  BP: (136-142)/(87-99) 136/87      Weight: 79.5 kg (175 lb 4.3 oz)  Body mass index is 31.05 kg/m².     Physical Exam  Vitals and nursing note reviewed.   Constitutional:       General: She is awake. She is not in acute distress.     Appearance: She is obese. She is not ill-appearing.   HENT:      Head: Normocephalic and atraumatic.      Mouth/Throat:      Mouth: Mucous membranes are moist.   Eyes:      General: No scleral icterus.     Conjunctiva/sclera: Conjunctivae normal.   Cardiovascular:      Rate and Rhythm: Normal rate and regular rhythm.   Pulmonary:      Effort: Pulmonary effort is normal. No respiratory distress.      Breath sounds: Normal breath sounds. No wheezing.   Abdominal:      Palpations: Abdomen is soft.      Tenderness: There is no abdominal tenderness.   Musculoskeletal:         General: No swelling. Normal range of motion.      Cervical back: Normal range of motion and neck supple.   Skin:     General: Skin is warm.      Coloration: Skin is not jaundiced.      Findings: left axilla dressing noted    Neurological:       General: No focal deficit present.      Mental Status: She is alert and oriented to person, place, and time. Mental status is at baseline.   Psychiatric:         Speech: Speech normal.         Behavior: Behavior is cooperative.           Microbiology Results (last 7 days)     Procedure Component Value Units Date/Time    Blood Culture #2 **CANNOT BE ORDERED STAT** [[id number]] Collected: 09/23/23 1959    Order Status: Completed Specimen: Blood from Peripheral, Forearm, Right Updated: 09/26/23 0612     Blood Culture, Routine No Growth to date      No Growth to date      No Growth to date    MRSA/SA Rapid ID by PCR from Blood culture [[id number]] Collected: 09/23/23 1959    Order Status: Completed Updated: 09/25/23 0214     Staph aureus ID by PCR Negative     Methicillin Resistant ID by PCR Negative    Blood Culture #1 **CANNOT BE ORDERED STAT** [[id number]] Collected: 09/23/23 1959    Order Status: Completed Specimen: Blood from Peripheral, Antecubital, Right Updated: 09/25/23 0056     Blood Culture, Routine Gram stain aer bottle: Gram positive cocci in clusters resembling Staph      Results called to and read back by: Gely Law RN 09/25/2023  00:55

## 2023-09-26 NOTE — ASSESSMENT & PLAN NOTE
-secondary to left axillary abscess/hidradenitis suppurative.    - IV vancomycin, cefepime  - Change to Zyvox x 10 days on discharge per ID recs  -hemodynamically stable.

## 2023-09-26 NOTE — PROGRESS NOTES
[facility]  [facility] Medicine  Progress Note    Patient Name: Barby Farr  MRN: [id number]  Patient Class: IP- Inpatient   Admission Date: 9/23/2023  Length of Stay: 3 days  Attending Physician: Nehal Hernandez MD  Primary Care Provider: Antonette Leahy MD        Subjective:     Principal Problem:Abscess of left axilla        HPI:  Ms. Farr is a 23-year-old  female with PMH significant for hidradenitis suppurativa, has been on doxycycline daily since July 2023 for suppressive treatment, followed by dermatology, presented to the ED complaining of worsening swelling, pain in the left axillary region, extending to the left chest, associated with low-grade fever.  Afebrile in the ED, , WBC 10541, no bandemia lactic acidosis.  CT shoulder without contrast reveals soft tissue mass along the axillary region.  ED physician discussed case with General surgery on-call, recommended placing in observation for possible I&D in a.m..  Patient started on IV vancomycin, cefepime after obtaining cultures.      Placed in observation for left axillary abscess, history of suppurative hidradenitis.      Overview/Hospital Course:  The patient presented with  left axillary abscess. She was placed on IV antibiotics due to significant leukocytosis in presence of oral antibiotics. Surgery was consulted and patient underwent I&D with penrose placement.  Blood cultures taken on 9/23 and noted to have Gram positive cocci in clusters resembling Staph.  ID consulted on 9/25.  Awaiting further recommendations.      Interval History: Patient doing much better this morning.  Pain better controlled.  Penrose will remain in place until follow up appt.  Blood cultures +, awaiting final cultures to determine outpatient antibiotic regimen.  ID assisting with POC.    Review of Systems  Objective:     Vital Signs (Most Recent):  Temp: 98.3 °F (36.8 °C) (09/26/23 0749)  Pulse: 88 (09/26/23 0749)  Resp: 16 (09/26/23  0905)  BP: (!) 150/79 (09/26/23 0749)  SpO2: 100 % (09/26/23 0749) Vital Signs (24h Range):  Temp:  [97.4 °F (36.3 °C)-98.9 °F (37.2 °C)] 98.3 °F (36.8 °C)  Pulse:  [68-88] 88  Resp:  [14-18] 16  SpO2:  [98 %-100 %] 100 %  BP: (108-150)/(56-90) 150/79     Weight: 78.9 kg (173 lb 15.1 oz)  Body mass index is 30.81 kg/m².    Intake/Output Summary (Last 24 hours) at 9/26/2023 1116  Last data filed at 9/26/2023 0418  Gross per 24 hour   Intake 788.55 ml   Output --   Net 788.55 ml         Physical Exam  Vitals and nursing note reviewed.   Constitutional:       Appearance: Normal appearance.   HENT:      Head: Normocephalic and atraumatic.      Nose: Nose normal.      Mouth/Throat:      Mouth: Mucous membranes are moist.   Eyes:      Extraocular Movements: Extraocular movements intact.      Conjunctiva/sclera: Conjunctivae normal.   Cardiovascular:      Rate and Rhythm: Normal rate and regular rhythm.      Pulses: Normal pulses.      Heart sounds: Normal heart sounds.   Pulmonary:      Effort: Pulmonary effort is normal.      Breath sounds: Normal breath sounds.   Abdominal:      General: Abdomen is flat. Bowel sounds are normal.      Palpations: Abdomen is soft.   Musculoskeletal:         General: Normal range of motion.      Cervical back: Normal range of motion and neck supple.      Comments: Left axilla dressing in place   Skin:     General: Skin is warm.      Capillary Refill: Capillary refill takes less than 2 seconds.   Neurological:      Mental Status: She is alert. Mental status is at baseline.   Psychiatric:         Mood and Affect: Mood normal.         Behavior: Behavior normal.             Significant Labs: All pertinent labs within the past 24 hours have been reviewed.  Recent Lab Results         09/26/23  0402        Anion Gap 8       Baso # 0.05       Basophil % 0.4       BUN 9       Calcium 8.3       Chloride 108       CO2 23       Creatinine 0.6       Differential Method Automated       eGFR >60        Eos # 0.4       Eosinophil % 3.0       Glucose 119       Gran # (ANC) 7.5       Gran % 61.9       Hematocrit 37.2       Hemoglobin 12.0       Immature Grans (Abs) 0.03  Comment: Mild elevation in immature granulocytes is non specific and   can be seen in a variety of conditions including stress response,   acute inflammation, trauma and pregnancy. Correlation with other   laboratory and clinical findings is essential.         Immature Granulocytes 0.2       Lymph # 3.7       Lymph % 30.5       MCH 30.6       MCHC 32.3       MCV 95       Mono # 0.5       Mono % 4.0       MPV 11.1       nRBC 0       Platelets 265       Potassium 3.1       RBC 3.92       RDW 11.8       Sodium 139       WBC 12.15               Significant Imaging:   CT Shoulder Without Contrast Left   Final Result   Abnormal      As above.      This report was flagged in Epic as abnormal.      All CT scans at this facility are performed  using dose modulation techniques as appropriate to performed exam including the following:  automated exposure control; adjustment of mA and/or kV according to the patients size (this includes techniques or standardized protocols for targeted exams where dose is matched to indication/reason for exam: i.e. extremities or head);  iterative reconstruction technique.         Electronically signed by: Freddie Boudreaux   Date:    09/23/2023   Time:    20:16             Assessment/Plan:      * Abscess of left axilla  -s/p I&D with Dr. Moseley on 9/24.  Remove packing on POD #1.  Penrose drain to remain in place with 10-14 day outpatient surgical follow up for removal.  -continue IV vancomycin, cefepime.  -ID consulted for outpatient antibiotic management.  -monitor drainage  - Leukocytosis resolved.      Hidradenitis suppurativa of left axilla  -per patient, has been on suppressive therapy with doxycycline for the past few months, followed by Dermatology.      Bacteremia  - Blood cultures 9/23 1:2 bottles: Gram positive cocci in  clusters resembling Staph  - continue cefepime and vanc  - ID assisting with POC      SIRS (systemic inflammatory response syndrome)  -secondary to left axillary abscess/hidradenitis suppurative.    -continue IV vancomycin, cefepime.    -continue fluids.    -hemodynamically stable.        VTE Risk Mitigation (From admission, onward)         Ordered     Place sequential compression device  Until discontinued         09/23/23 2133                Discharge Planning   RONNELL:      Code Status: Full Code   Is the patient medically ready for discharge?:     Reason for patient still in hospital (select all that apply): Patient new problem, Patient trending condition, Treatment and Consult recommendations  Discharge Plan A: Home                  Nehal Hernandez MD  Department of Hospital Medicine   [facility]

## 2023-09-26 NOTE — PLAN OF CARE
Problem: Adult Inpatient Plan of Care  Goal: Plan of Care Review  Outcome: Ongoing, Progressing  Flowsheets (Taken 9/26/2023 4218)  Plan of Care Reviewed With: patient

## 2023-09-26 NOTE — DISCHARGE SUMMARY
[facility]  [facility] Medicine  Discharge Summary      Patient Name: Barby Farr  MRN: [id number]  [facility]: [id number]  Patient Class: IP- Inpatient  Admission Date: 9/23/2023  Hospital Length of Stay: 3 days  Discharge Date and Time:  09/26/2023 4:50 PM  Attending Physician: Nehal Hernandez MD   Discharging Provider: Nehal Hernandez MD  Primary Care Provider: Antonette Leahy MD    Primary Care Team: Networked reference to record PCT     HPI:   Ms. Farr is a 23-year-old  female with PMH significant for hidradenitis suppurativa, has been on doxycycline daily since July 2023 for suppressive treatment, followed by dermatology, presented to the ED complaining of worsening swelling, pain in the left axillary region, extending to the left chest, associated with low-grade fever.  Afebrile in the ED, , WBC 96246, no bandemia lactic acidosis.  CT shoulder without contrast reveals soft tissue mass along the axillary region.  ED physician discussed case with General surgery on-call, recommended placing in observation for possible I&D in a.m..  Patient started on IV vancomycin, cefepime after obtaining cultures.      Placed in observation for left axillary abscess, history of suppurative hidradenitis.      Procedure(s) (LRB):  INCISION AND DRAINAGE, ABSCESS (Left)      Hospital Course:   The patient presented with  left axillary abscess. She was placed on IV antibiotics due to significant leukocytosis in presence of oral antibiotics. Surgery was consulted and patient underwent I&D with penrose placement.  Blood cultures taken on 9/23 and noted to have Gram positive cocci in clusters resembling Staph.  ID consulted on 9/25.  Leukocytosis resolved and blood cultures found to be a contaminant on final read.  Dr Burden recommended 10 days of zyvox.  She will have a penrose drain in for 7-10 days and will be pulled by Dr. Moseley at her follow up.  She will also need to follow up with Dr. Blanco  (dermatology) upon discharge.  POC dw patient and Dr. Burden.  Patient was cleared by surgery yesterday.       Goals of Care Treatment Preferences:  Code Status: Full Code      Consults:   Consults (From admission, onward)        Status Ordering Provider     Inpatient consult to Infectious Diseases  Once        Provider:  Blayne Burden MD, BARBARA Casas     Inpatient consult to General Surgery  Once        Provider:  Antonio Moseley MD    Completed ELENA GONZALEZ          ID  * Abscess of left axilla  -s/p I&D with Dr. Moseley on 9/24.  Remove packing on POD #1.  Penrose drain to remain in place with 10-14 day outpatient surgical follow up for removal.  - IV vancomycin, cefepime - changed to zyvox x 10 days   -ID consulted for outpatient antibiotic management.  -monitor drainage  - Leukocytosis resolved.      Bacteremia  - Blood cultures 9/23 1:2 bottles: Gram positive cocci in clusters resembling Staph (Contamninent)  - continue cefepime and vanc - change to Zyvox on discharge  - ID assisting with POC      SIRS (systemic inflammatory response syndrome)  -secondary to left axillary abscess/hidradenitis suppurative.    - IV vancomycin, cefepime  - Change to Zyvox x 10 days on discharge per ID recs  -hemodynamically stable.      Other  Hidradenitis suppurativa of left axilla  -per patient, has been on suppressive therapy with doxycycline for the past few months, followed by Dermatology.  - F/U with Dr. Blanco outpatient.        Final Active Diagnoses:    Diagnosis Date Noted POA    PRINCIPAL PROBLEM:  Abscess of left axilla [L02.412] 09/23/2023 Yes    Hidradenitis suppurativa of left axilla [L73.2] 09/23/2023 Yes    Bacteremia [R78.81] 09/26/2023 Unknown    SIRS (systemic inflammatory response syndrome) [R65.10] 09/23/2023 Yes      Problems Resolved During this Admission:       Discharged Condition: stable    Disposition: Home or Self Care    Follow Up:   Follow-up Information     Antonio Moseley  "MD SHELLEY Follow up in 1 week(s).    Specialties: General Surgery, Bariatrics  Contact information:  [address]  [address]  [facility] [address] [phone]             Asha Sher MD Follow up in 3 day(s).    Specialty: Dermatology  Contact information:  [address]  [facility] [address]  [phone]                       Patient Instructions:      Diet Adult Regular     Notify your health care provider if you experience any of the following:  temperature >100.4     Notify your health care provider if you experience any of the following:  redness, tenderness, or signs of infection (pain, swelling, redness, odor or green/yellow discharge around incision site)     Change dressing (specify)   Order Comments: Dressing change: Dry dressing daily.  Change more often if soiled     Activity as tolerated       Significant Diagnostic Studies: Labs:   BMP:   Recent Labs   Lab 09/25/23  0900 09/26/23  0402   * 119*    139   K 3.4* 3.1*    108   CO2 22* 23   BUN 4* 9   CREATININE 0.6 0.6   CALCIUM 8.5* 8.3*   , CMP   Recent Labs   Lab 09/25/23  0900 09/26/23  0402    139   K 3.4* 3.1*    108   CO2 22* 23   * 119*   BUN 4* 9   CREATININE 0.6 0.6   CALCIUM 8.5* 8.3*   ANIONGAP 12 8   , CBC   Recent Labs   Lab 09/25/23  0900 09/26/23  0402   WBC 17.78* 12.15   HGB 12.7 12.0   HCT 37.6 37.2    265    and Lipid Panel No results found for: "CHOL", "HDL", "LDLCALC", "TRIG", "CHOLHDL"  Microbiology:   Blood Culture   Lab Results   Component Value Date    LABBLOO No Growth to date 09/23/2023    LABBLOO No Growth to date 09/23/2023    LABBLOO No Growth to date 09/23/2023    LABBLOO  09/23/2023     Gram stain aer bottle: Gram positive cocci in clusters resembling Staph    LABBLOO  09/23/2023     Results called to and read back by: Gely Law RN 09/25/2023  00:55    LABBLOO (A) 09/23/2023     COAGULASE-NEGATIVE STAPHYLOCOCCUS SPECIES  Organism is a probable " contaminant       Radiology:   CT Shoulder Without Contrast Left   Final Result   Abnormal      As above.      This report was flagged in Epic as abnormal.      All CT scans at this facility are performed  using dose modulation techniques as appropriate to performed exam including the following:  automated exposure control; adjustment of mA and/or kV according to the patients size (this includes techniques or standardized protocols for targeted exams where dose is matched to indication/reason for exam: i.e. extremities or head);  iterative reconstruction technique.         Electronically signed by: Freddie Boudreaux   Date:    09/23/2023   Time:    20:16          Pending Diagnostic Studies:     None         Medications:  Reconciled Home Medications:      Medication List      START taking these medications    HYDROcodone-acetaminophen  mg per tablet  Commonly known as: NORCO  Take 1 tablet by mouth every 6 (six) hours as needed for Pain.     linezolid 600 mg Tab  Commonly known as: ZYVOX  Take 1 tablet (600 mg total) by mouth every 12 (twelve) hours.            Indwelling Lines/Drains at time of discharge:   Lines/Drains/Airways     Drain  Duration                Open Drain 09/24/23 1106 Tube - 1 Left Other (Comment) Penrose 1/2 inch 2 days                Time spent on the discharge of patient: 45 minutes         Nehal Hernandez MD  Department of Hospital Medicine  [facility]

## 2023-09-26 NOTE — CARE UPDATE
Derm  Hidradenitis suppurativa of left axilla  Will continue wound care.  On vanco/Cefepime    ID  * Abscess of left axilla  Will continue Vanco/Cefepime- follow final cultures to guide regime  S/p I and D    Bacteremia  Will follow Final ID of org ,on vanco

## 2023-09-26 NOTE — ASSESSMENT & PLAN NOTE
- Blood cultures 9/23 1:2 bottles: Gram positive cocci in clusters resembling Staph  - continue cefepime and vanc  - ID assisting with POC

## 2023-09-27 LAB
BACTERIA BLD CULT: ABNORMAL

## 2023-09-29 LAB — BACTERIA BLD CULT: NORMAL

## 2023-10-02 ENCOUNTER — ON-DEMAND VIRTUAL (OUTPATIENT)
Dept: URGENT CARE | Facility: CLINIC | Age: 23
End: 2023-10-02
Payer: MEDICAID

## 2023-10-02 ENCOUNTER — PATIENT MESSAGE (OUTPATIENT)
Dept: SURGERY | Facility: CLINIC | Age: 23
End: 2023-10-02
Payer: MEDICAID

## 2023-10-02 DIAGNOSIS — B37.31 VAGINAL YEAST INFECTION: Primary | ICD-10-CM

## 2023-10-02 PROCEDURE — 99213 PR OFFICE/OUTPT VISIT, EST, LEVL III, 20-29 MIN: ICD-10-PCS | Mod: 95,,, | Performed by: FAMILY MEDICINE

## 2023-10-02 PROCEDURE — 99213 OFFICE O/P EST LOW 20 MIN: CPT | Mod: 95,,, | Performed by: FAMILY MEDICINE

## 2023-10-02 RX ORDER — ASPIRIN 325 MG
1 TABLET, DELAYED RELEASE (ENTERIC COATED) ORAL NIGHTLY
Qty: 90 G | Refills: 0 | Status: SHIPPED | OUTPATIENT
Start: 2023-10-02 | End: 2023-10-16

## 2023-10-02 RX ORDER — FLUCONAZOLE 150 MG/1
TABLET ORAL
Qty: 2 TABLET | Refills: 0 | Status: SHIPPED | OUTPATIENT
Start: 2023-10-02 | End: 2023-10-25 | Stop reason: SDUPTHER

## 2023-10-02 NOTE — PROGRESS NOTES
Subjective:      Patient ID: Barby Farr is a 23 y.o. female.    Vitals:  vitals were not taken for this visit.     Chief Complaint: Vaginal Discharge      Visit Type: TELE AUDIOVISUAL    Present with the patient at the time of consultation: TELEMED PRESENT WITH PATIENT: None    Past Medical History:   Diagnosis Date    Asthma      Past Surgical History:   Procedure Laterality Date    INCISION AND DRAINAGE OF ABSCESS Left 9/24/2023    Procedure: INCISION AND DRAINAGE, ABSCESS;  Surgeon: Antonio Moseley MD;  Location: [facility];  Service: General;  Laterality: Left;  left axilla     Review of patient's allergies indicates:  No Known Allergies  Current Outpatient Medications on File Prior to Visit   Medication Sig Dispense Refill    HYDROcodone-acetaminophen (NORCO)  mg per tablet Take 1 tablet by mouth every 6 (six) hours as needed for Pain. 20 tablet 0    linezolid (ZYVOX) 600 mg Tab Take 1 tablet (600 mg total) by mouth every 12 (twelve) hours. 20 tablet 0     No current facility-administered medications on file prior to visit.     History reviewed. No pertinent family history.    Medications Ordered                [id number] - [place], [place] - [address] AT [facility]   [address], [facility] [address]    Telephone: [phone]   Fax: [phone]   Hours: Not open 24 hours                         E-Prescribed (2 of 2)              clotrimazole (LOTRIMIN) 1 % Crea    Sig: Place 1 suppository (1 applicator total) vaginally every evening. for 14 days       Start: 10/2/23     Quantity: 90 g Refills: 0                         fluconazole (DIFLUCAN) 150 MG Tab    Sig: Take one tablet daily for 2 days once you have completed your oral antibiotics.       Start: 10/2/23     Quantity: 2 tablet Refills: 0                           Ohs Peq Odvv Intake    10/2/2023 11:30 AM CDT - Filed by Patient   Describe your reason for todays visit yeast infection   What is your  current physical address in the event of a medical emergency? [id number]    Are you able to take your vital signs? No   Please attach any relevant images or files          Vaginal Discharge  The patient's primary symptoms include genital itching and vaginal discharge. The patient's pertinent negatives include no genital lesions, genital odor, genital rash, missed menses, pelvic pain or vaginal bleeding. Episode onset: Had surgery for hydradinitis. The problem occurs constantly. The problem has been gradually worsening. She is not pregnant. Pertinent negatives include no abdominal pain, anorexia, back pain, chills, constipation, diarrhea, discolored urine, dysuria, fever, flank pain, frequency, headaches, hematuria, joint pain, joint swelling, nausea, painful intercourse, rash, sore throat, urgency or vomiting. The vaginal discharge was thick and white. There has been no bleeding. She has tried antibiotics for the symptoms. No, her partner does not have an STD.       Constitution: Negative for chills and fever.   HENT:  Negative for sore throat.    Gastrointestinal:  Negative for abdominal pain, nausea, vomiting, constipation and diarrhea.   Genitourinary:  Positive for vaginal discharge. Negative for dysuria, frequency, urgency, flank pain, hematuria, missed menses and pelvic pain.   Musculoskeletal:  Negative for back pain.   Skin:  Negative for rash.   Neurological:  Negative for headaches.        Objective:   The physical exam was conducted virtually.  Physical Exam   Constitutional: She is oriented to person, place, and time.   Eyes: Conjunctivae are normal.   Pulmonary/Chest: Effort normal. No respiratory distress.   Abdominal: Normal appearance. Soft. There is no abdominal tenderness.   Musculoskeletal:         General: No tenderness.   Neurological: She is alert and oriented to person, place, and time.   Skin: Skin is dry.   Psychiatric: Her behavior is normal.       Assessment:     1. Vaginal yeast  infection        Plan:       Vaginal yeast infection  -     fluconazole (DIFLUCAN) 150 MG Tab; Take one tablet daily for 2 days once you have completed your oral antibiotics.  Dispense: 2 tablet; Refill: 0  -     clotrimazole (LOTRIMIN) 1 % Crea; Place 1 suppository (1 applicator total) vaginally every evening. for 14 days  Dispense: 90 g; Refill: 0

## 2023-10-04 ENCOUNTER — PATIENT MESSAGE (OUTPATIENT)
Dept: SURGERY | Facility: CLINIC | Age: 23
End: 2023-10-04
Payer: MEDICAID

## 2023-10-09 ENCOUNTER — OFFICE VISIT (OUTPATIENT)
Dept: SURGERY | Facility: CLINIC | Age: 23
End: 2023-10-09
Payer: MEDICAID

## 2023-10-09 VITALS
SYSTOLIC BLOOD PRESSURE: 134 MMHG | HEART RATE: 103 BPM | TEMPERATURE: 99 F | HEIGHT: 63 IN | DIASTOLIC BLOOD PRESSURE: 90 MMHG | WEIGHT: 179.88 LBS | BODY MASS INDEX: 31.87 KG/M2

## 2023-10-09 DIAGNOSIS — L02.412 ABSCESS OF LEFT AXILLA: Primary | ICD-10-CM

## 2023-10-09 PROCEDURE — 99213 OFFICE O/P EST LOW 20 MIN: CPT | Mod: PBBFAC | Performed by: SURGERY

## 2023-10-09 PROCEDURE — 1159F PR MEDICATION LIST DOCUMENTED IN MEDICAL RECORD: ICD-10-PCS | Mod: CPTII,,, | Performed by: SURGERY

## 2023-10-09 PROCEDURE — 1160F RVW MEDS BY RX/DR IN RCRD: CPT | Mod: CPTII,,, | Performed by: SURGERY

## 2023-10-09 PROCEDURE — 99024 POSTOP FOLLOW-UP VISIT: CPT | Mod: ,,, | Performed by: SURGERY

## 2023-10-09 PROCEDURE — 3075F SYST BP GE 130 - 139MM HG: CPT | Mod: CPTII,,, | Performed by: SURGERY

## 2023-10-09 PROCEDURE — 99024 PR POST-OP FOLLOW-UP VISIT: ICD-10-PCS | Mod: ,,, | Performed by: SURGERY

## 2023-10-09 PROCEDURE — 99999 PR PBB SHADOW E&M-EST. PATIENT-LVL III: CPT | Mod: PBBFAC,,, | Performed by: SURGERY

## 2023-10-09 PROCEDURE — 3075F PR MOST RECENT SYSTOLIC BLOOD PRESS GE 130-139MM HG: ICD-10-PCS | Mod: CPTII,,, | Performed by: SURGERY

## 2023-10-09 PROCEDURE — 3080F PR MOST RECENT DIASTOLIC BLOOD PRESSURE >= 90 MM HG: ICD-10-PCS | Mod: CPTII,,, | Performed by: SURGERY

## 2023-10-09 PROCEDURE — 1160F PR REVIEW ALL MEDS BY PRESCRIBER/CLIN PHARMACIST DOCUMENTED: ICD-10-PCS | Mod: CPTII,,, | Performed by: SURGERY

## 2023-10-09 PROCEDURE — 99999 PR PBB SHADOW E&M-EST. PATIENT-LVL III: ICD-10-PCS | Mod: PBBFAC,,, | Performed by: SURGERY

## 2023-10-09 PROCEDURE — 3080F DIAST BP >= 90 MM HG: CPT | Mod: CPTII,,, | Performed by: SURGERY

## 2023-10-09 PROCEDURE — 1159F MED LIST DOCD IN RCRD: CPT | Mod: CPTII,,, | Performed by: SURGERY

## 2023-10-09 NOTE — PROGRESS NOTES
History & Physical    SUBJECTIVE:     History of Present Illness:  Patient is a 23 y.o. female status post incision drainage left axillary abscess 09/24/2023 presents for postop.  She reports drainage from the area but decreased inflammation.  Area is always sensitive due to the hidradenitis.  She was waiting to have drain removed to follow up with her dermatologist to discuss biologic medications.    No chief complaint on file.      Review of patient's allergies indicates:  No Known Allergies    Current Outpatient Medications   Medication Sig Dispense Refill    clotrimazole (LOTRIMIN) 1 % Crea Place 1 suppository (1 applicator total) vaginally every evening. for 14 days 90 g 0    fluconazole (DIFLUCAN) 150 MG Tab Take one tablet daily for 2 days once you have completed your oral antibiotics. 2 tablet 0    HYDROcodone-acetaminophen (NORCO)  mg per tablet Take 1 tablet by mouth every 6 (six) hours as needed for Pain. 20 tablet 0    linezolid (ZYVOX) 600 mg Tab Take 1 tablet (600 mg total) by mouth every 12 (twelve) hours. 20 tablet 0     No current facility-administered medications for this visit.       Past Medical History:   Diagnosis Date    Asthma      Past Surgical History:   Procedure Laterality Date    INCISION AND DRAINAGE OF ABSCESS Left 9/24/2023    Procedure: INCISION AND DRAINAGE, ABSCESS;  Surgeon: Antonio Moseley MD;  Location: [facility];  Service: General;  Laterality: Left;  left axilla     No family history on file.  Social History     Tobacco Use    Smoking status: Never   Substance Use Topics    Alcohol use: No    Drug use: No        Review of Systems:  Review of Systems   Constitutional:  Negative for chills, fatigue, fever and unexpected weight change.   Respiratory:  Negative for cough, shortness of breath, wheezing and stridor.    Cardiovascular:  Negative for chest pain, palpitations and leg swelling.   Gastrointestinal:  Negative for abdominal distention, abdominal pain, constipation,  "diarrhea, nausea and vomiting.   Genitourinary:  Negative for difficulty urinating, dysuria, frequency, hematuria and urgency.   Skin:  Positive for wound. Negative for color change, pallor and rash.   Hematological:  Does not bruise/bleed easily.       OBJECTIVE:     Vital Signs (Most Recent)  Temp: 98.5 °F (36.9 °C) (10/09/23 0933)  Pulse: 103 (10/09/23 0933)  BP: (!) 134/90 (10/09/23 0933)  5' 3" (1.6 m)  81.6 kg (179 lb 14.3 oz)     Physical Exam:  Physical Exam  Vitals reviewed.   Constitutional:       Appearance: She is well-developed.   HENT:      Head: Normocephalic and atraumatic.   Cardiovascular:      Rate and Rhythm: Normal rate and regular rhythm.   Pulmonary:      Effort: Pulmonary effort is normal.      Breath sounds: Normal breath sounds.   Abdominal:      General: Bowel sounds are normal. There is no distension.      Palpations: Abdomen is soft.      Tenderness: There is no abdominal tenderness.   Musculoskeletal:      Cervical back: Neck supple.   Skin:     General: Skin is warm and dry.          Neurological:      Mental Status: She is alert and oriented to person, place, and time.           ASSESSMENT/PLAN:     23-year-old female status post left axillary abscess incision and drainage    PLAN:Plan     Penrose drain removed   Continue local wound care with wet-to-dry gauze dressings changes daily  She will follow-up with her dermatologist to discuss alternative medications for her hidradenitis   Follow-up in 2 weeks for wound check       "

## 2023-10-25 ENCOUNTER — OFFICE VISIT (OUTPATIENT)
Dept: SURGERY | Facility: CLINIC | Age: 23
End: 2023-10-25
Payer: MEDICAID

## 2023-10-25 VITALS
DIASTOLIC BLOOD PRESSURE: 81 MMHG | HEART RATE: 104 BPM | BODY MASS INDEX: 32.06 KG/M2 | WEIGHT: 181 LBS | SYSTOLIC BLOOD PRESSURE: 112 MMHG

## 2023-10-25 DIAGNOSIS — L73.2 HIDRADENITIS SUPPURATIVA OF LEFT AXILLA: Primary | ICD-10-CM

## 2023-10-25 DIAGNOSIS — B37.31 VAGINAL YEAST INFECTION: ICD-10-CM

## 2023-10-25 PROCEDURE — 1159F PR MEDICATION LIST DOCUMENTED IN MEDICAL RECORD: ICD-10-PCS | Mod: CPTII,,,

## 2023-10-25 PROCEDURE — 99024 PR POST-OP FOLLOW-UP VISIT: ICD-10-PCS | Mod: ,,,

## 2023-10-25 PROCEDURE — 99024 POSTOP FOLLOW-UP VISIT: CPT | Mod: ,,,

## 2023-10-25 PROCEDURE — 1159F MED LIST DOCD IN RCRD: CPT | Mod: CPTII,,,

## 2023-10-25 PROCEDURE — 3079F PR MOST RECENT DIASTOLIC BLOOD PRESSURE 80-89 MM HG: ICD-10-PCS | Mod: CPTII,,,

## 2023-10-25 PROCEDURE — 99999 PR PBB SHADOW E&M-EST. PATIENT-LVL III: CPT | Mod: PBBFAC,,,

## 2023-10-25 PROCEDURE — 1160F RVW MEDS BY RX/DR IN RCRD: CPT | Mod: CPTII,,,

## 2023-10-25 PROCEDURE — 3079F DIAST BP 80-89 MM HG: CPT | Mod: CPTII,,,

## 2023-10-25 PROCEDURE — 99213 OFFICE O/P EST LOW 20 MIN: CPT | Mod: PBBFAC

## 2023-10-25 PROCEDURE — 99999 PR PBB SHADOW E&M-EST. PATIENT-LVL III: ICD-10-PCS | Mod: PBBFAC,,,

## 2023-10-25 PROCEDURE — 3074F SYST BP LT 130 MM HG: CPT | Mod: CPTII,,,

## 2023-10-25 PROCEDURE — 3074F PR MOST RECENT SYSTOLIC BLOOD PRESSURE < 130 MM HG: ICD-10-PCS | Mod: CPTII,,,

## 2023-10-25 PROCEDURE — 1160F PR REVIEW ALL MEDS BY PRESCRIBER/CLIN PHARMACIST DOCUMENTED: ICD-10-PCS | Mod: CPTII,,,

## 2023-10-25 RX ORDER — FLUCONAZOLE 150 MG/1
TABLET ORAL
Qty: 2 TABLET | Refills: 0 | Status: SHIPPED | OUTPATIENT
Start: 2023-10-25

## 2023-10-25 RX ORDER — CLINDAMYCIN HYDROCHLORIDE 300 MG/1
300 CAPSULE ORAL EVERY 8 HOURS
Qty: 15 CAPSULE | Refills: 0 | Status: SHIPPED | OUTPATIENT
Start: 2023-10-25 | End: 2023-10-30

## 2023-10-25 NOTE — PROGRESS NOTES
History & Physical    SUBJECTIVE:     History of Present Illness:  Patient is a 23 y.o. female status post incision drainage left axillary abscess 09/24/2023 presents for postop.  She reports drainage from the area but decreased inflammation.  Area is always sensitive due to the hidradenitis.  She was waiting to have drain removed to follow up with her dermatologist to discuss biologic medications.    Interval History:  Since the last clinic visit, the previous Penrose drain sites have healed.  She does have some pain and.  To this with some swelling.  It is mild at this time.  There is a small amount of drainage.  She denies any fevers, chills, nausea, and vomiting.  She still has not followed up with Dermatology.  [facility]    Chief Complaint   Patient presents with    Post-op Evaluation     I&D of left axilla        Review of patient's allergies indicates:  No Known Allergies    Current Outpatient Medications   Medication Sig Dispense Refill    fluconazole (DIFLUCAN) 150 MG Tab Take one tablet daily for 2 days once you have completed your oral antibiotics. 2 tablet 0    HYDROcodone-acetaminophen (NORCO)  mg per tablet Take 1 tablet by mouth every 6 (six) hours as needed for Pain. 20 tablet 0    linezolid (ZYVOX) 600 mg Tab Take 1 tablet (600 mg total) by mouth every 12 (twelve) hours. 20 tablet 0    clindamycin (CLEOCIN) 300 MG capsule Take 1 capsule (300 mg total) by mouth every 8 (eight) hours. for 5 days 15 capsule 0     No current facility-administered medications for this visit.       Past Medical History:   Diagnosis Date    Asthma      Past Surgical History:   Procedure Laterality Date    INCISION AND DRAINAGE OF ABSCESS Left 9/24/2023    Procedure: INCISION AND DRAINAGE, ABSCESS;  Surgeon: Antonio Moseley MD;  Location: [facility] [place];  Service: General;  Laterality: Left;  left axilla     History reviewed. No pertinent family history.  Social History     Tobacco Use    Smoking  status: Never   Substance Use Topics    Alcohol use: No    Drug use: No        Review of Systems:  Review of Systems   Constitutional:  Negative for chills, fatigue, fever and unexpected weight change.   Respiratory:  Negative for cough, shortness of breath, wheezing and stridor.    Cardiovascular:  Negative for chest pain, palpitations and leg swelling.   Gastrointestinal:  Negative for abdominal distention, abdominal pain, constipation, diarrhea, nausea and vomiting.   Genitourinary:  Negative for difficulty urinating, dysuria, frequency, hematuria and urgency.   Skin:  Negative for color change, pallor, rash and wound.   Hematological:  Does not bruise/bleed easily.       OBJECTIVE:     Vital Signs (Most Recent)  Pulse: 104 (10/25/23 1105)  BP: 112/81 (10/25/23 1105)     82.1 kg (181 lb)     Physical Exam:  Physical Exam  Vitals reviewed.   Constitutional:       General: She is not in acute distress.     Appearance: She is well-developed. She is not ill-appearing.   HENT:      Head: Normocephalic and atraumatic.      Right Ear: External ear normal.      Left Ear: External ear normal.      Nose: Nose normal.      Mouth/Throat:      Mouth: Mucous membranes are moist.   Eyes:      Extraocular Movements: Extraocular movements intact.      Conjunctiva/sclera: Conjunctivae normal.   Cardiovascular:      Rate and Rhythm: Normal rate.   Pulmonary:      Effort: Pulmonary effort is normal. No respiratory distress.   Musculoskeletal:      Cervical back: Neck supple.   Skin:     General: Skin is warm and dry.          Neurological:      Mental Status: She is alert and oriented to person, place, and time.   Psychiatric:         Behavior: Behavior normal.           ASSESSMENT/PLAN:     23-year-old female status post left axillary abscess incision and drainage    - Bactrim b.i.d. x5 days with warm compresses to the left axilla.  No drainable abscess, call with worsening pain, swelling, redness, or fevers.  - sent Diflucan for  vaginal yeast infection with antibiotic.  - follow up at [facility] as soon as possible to further discuss biologic therapy.  - return to clinic as needed or if new abscess arises.      Julee Damian PA-C  [facility]

## (undated) DEVICE — SYR 10CC LUER LOCK

## (undated) DEVICE — MANIFOLD 4 PORT

## (undated) DEVICE — SOL IRR NACL .9% 3000ML

## (undated) DEVICE — GLOVE SURG BIOGEL LATEX SZ 7.5

## (undated) DEVICE — TUBING MEDI-VAC 20FT .25IN

## (undated) DEVICE — COVER LIGHT HANDLE 80/CA

## (undated) DEVICE — NDL ECLIPSE SAFETY 18GX1-1/2IN

## (undated) DEVICE — DRAIN PENRS STERILE LTX 18X1/2

## (undated) DEVICE — PACK BASIC SETUP SC BR

## (undated) DEVICE — SOL NACL IRR 1000ML BTL

## (undated) DEVICE — TOWEL OR DISP STRL BLUE 4/PK

## (undated) DEVICE — ELECTRODE REM PLYHSV RETURN 9